# Patient Record
Sex: FEMALE | Race: WHITE | NOT HISPANIC OR LATINO | Employment: FULL TIME | URBAN - METROPOLITAN AREA
[De-identification: names, ages, dates, MRNs, and addresses within clinical notes are randomized per-mention and may not be internally consistent; named-entity substitution may affect disease eponyms.]

---

## 2017-01-02 DIAGNOSIS — I10 ESSENTIAL HYPERTENSION: ICD-10-CM

## 2017-01-03 RX ORDER — BENAZEPRIL HYDROCHLORIDE 5 MG/1
5 TABLET, FILM COATED ORAL DAILY
Qty: 30 TABLET | Refills: 0 | Status: SHIPPED | OUTPATIENT
Start: 2017-01-03 | End: 2017-01-28 | Stop reason: SDUPTHER

## 2017-01-28 DIAGNOSIS — I10 ESSENTIAL HYPERTENSION: ICD-10-CM

## 2017-01-30 RX ORDER — BENAZEPRIL HYDROCHLORIDE 5 MG/1
5 TABLET, FILM COATED ORAL DAILY
Qty: 15 TABLET | Refills: 0 | Status: SHIPPED | OUTPATIENT
Start: 2017-01-30 | End: 2017-02-03 | Stop reason: SDUPTHER

## 2017-02-03 ENCOUNTER — OFFICE VISIT (OUTPATIENT)
Dept: INTERNAL MEDICINE | Age: 57
End: 2017-02-03

## 2017-02-03 VITALS
TEMPERATURE: 96.7 F | OXYGEN SATURATION: 99 % | HEIGHT: 63 IN | BODY MASS INDEX: 24.17 KG/M2 | HEART RATE: 74 BPM | SYSTOLIC BLOOD PRESSURE: 130 MMHG | WEIGHT: 136.4 LBS | DIASTOLIC BLOOD PRESSURE: 74 MMHG

## 2017-02-03 DIAGNOSIS — I10 ESSENTIAL HYPERTENSION: ICD-10-CM

## 2017-02-03 DIAGNOSIS — Z00.00 ROUTINE HEALTH MAINTENANCE: ICD-10-CM

## 2017-02-03 DIAGNOSIS — J20.9 ACUTE BRONCHITIS, UNSPECIFIED ORGANISM: ICD-10-CM

## 2017-02-03 DIAGNOSIS — I10 ESSENTIAL HYPERTENSION: Primary | ICD-10-CM

## 2017-02-03 PROCEDURE — 99213 OFFICE O/P EST LOW 20 MIN: CPT | Performed by: INTERNAL MEDICINE

## 2017-02-03 RX ORDER — AZITHROMYCIN 250 MG/1
TABLET, FILM COATED ORAL
Qty: 90 TABLET | Refills: 3 | Status: SHIPPED | OUTPATIENT
Start: 2017-02-03 | End: 2017-07-28

## 2017-02-03 RX ORDER — BENAZEPRIL HYDROCHLORIDE 5 MG/1
5 TABLET, FILM COATED ORAL DAILY
Qty: 30 TABLET | Refills: 5 | Status: SHIPPED | OUTPATIENT
Start: 2017-02-03 | End: 2017-07-28 | Stop reason: SINTOL

## 2017-02-03 RX ORDER — MELOXICAM 7.5 MG/1
7.5 TABLET ORAL
COMMUNITY
Start: 2016-08-12 | End: 2017-02-03

## 2017-02-03 NOTE — PROGRESS NOTES
Subjective   Nancie Sanchez is a 56 y.o. female.     History of Present Illness since today for 6 month follow-up of hypertension.  She has been compliant with medication, dietary salt restriction, regular exercise as well as outpatient blood pressure monitoring.  Pressure has been running in the 1:30 range systolic.    The following portions of the patient's history were reviewed and updated as appropriate: allergies, current medications, past family history, past medical history, past social history, past surgical history and problem list.    Review of Systems   Respiratory: Negative for chest tightness and shortness of breath.    Cardiovascular: Negative for chest pain and palpitations.   Neurological: Negative for dizziness, syncope, speech difficulty, weakness, light-headedness and headaches.       Objective   Physical Exam   Constitutional: She is oriented to person, place, and time. She appears well-developed and well-nourished. No distress.   Cardiovascular: Normal rate, regular rhythm, normal heart sounds and intact distal pulses.  Exam reveals no gallop and no friction rub.    No murmur heard.  Pulmonary/Chest: Effort normal and breath sounds normal. She has no wheezes. She has no rales.   Musculoskeletal: She exhibits no edema.   Neurological: She is alert and oriented to person, place, and time.   Skin: Skin is warm and dry. No rash noted.   Psychiatric: She has a normal mood and affect. Her behavior is normal. Judgment and thought content normal.   Nursing note and vitals reviewed.      Assessment/Plan   Nancie was seen today for hypertension.    Diagnoses and all orders for this visit:    Essential hypertension    Routine health maintenance      #1 essential hypertension stable on current medical regimen.  Plan: Same meds, blood pressure check 5 months.  We will check lab at next visit.    #2 routine health maintenance, physical exam July.

## 2017-07-28 ENCOUNTER — OFFICE VISIT (OUTPATIENT)
Dept: INTERNAL MEDICINE | Age: 57
End: 2017-07-28

## 2017-07-28 VITALS
WEIGHT: 144.8 LBS | DIASTOLIC BLOOD PRESSURE: 100 MMHG | HEART RATE: 63 BPM | HEIGHT: 63 IN | BODY MASS INDEX: 25.66 KG/M2 | OXYGEN SATURATION: 99 % | SYSTOLIC BLOOD PRESSURE: 200 MMHG | TEMPERATURE: 99 F

## 2017-07-28 DIAGNOSIS — R07.89 CHEST WALL PAIN: ICD-10-CM

## 2017-07-28 DIAGNOSIS — T46.4X5A COUGH DUE TO ACE INHIBITOR: ICD-10-CM

## 2017-07-28 DIAGNOSIS — I10 ESSENTIAL HYPERTENSION: ICD-10-CM

## 2017-07-28 DIAGNOSIS — Z00.00 ROUTINE HEALTH MAINTENANCE: Primary | ICD-10-CM

## 2017-07-28 DIAGNOSIS — R05.8 COUGH DUE TO ACE INHIBITOR: ICD-10-CM

## 2017-07-28 PROCEDURE — 99396 PREV VISIT EST AGE 40-64: CPT | Performed by: INTERNAL MEDICINE

## 2017-07-28 RX ORDER — AMLODIPINE BESYLATE 5 MG/1
5 TABLET ORAL DAILY
Qty: 30 TABLET | Refills: 1 | Status: SHIPPED | OUTPATIENT
Start: 2017-07-28 | End: 2017-08-31 | Stop reason: SINTOL

## 2017-07-28 NOTE — PROGRESS NOTES
"Nancie Sanchez / 56 y.o. / female  07/28/2017  VITALS    BP (!) 200/100  Pulse 63  Temp 99 °F (37.2 °C)  Ht 63\" (160 cm)  Wt 144 lb 12.8 oz (65.7 kg)  SpO2 99%  BMI 25.65 kg/m2  BP Readings from Last 3 Encounters:   07/28/17 (!) 200/100   06/30/17 166/79   02/03/17 130/74     Wt Readings from Last 3 Encounters:   07/28/17 144 lb 12.8 oz (65.7 kg)   02/03/17 136 lb 6.4 oz (61.9 kg)   01/22/17 128 lb (58.1 kg)      Body mass index is 25.65 kg/(m^2).    CC:  Main reason(s) for today's visit: Annual Exam (CPE); Chest Pain (in am over L breast area, questions stres with new job, SOA upon wakening); and Neck Pain (L sided)      HPI:     Patient was seen in the urgent care center early July with left-sided chest pain.  Present for approximately one month.  At the onset, it was intermittent, became more persistent.  Pain was noted to be altered slightly with movement, breathing, and was tender to touch.  She notes no pain with exertion.  There is an extremely significant family history of coronary artery disease.  Her sister just underwent bypass surgery and she is approximately 18 months older than the patient.  Pain was without radiation, nor was it associated with shortness of breath nausea and sweating.  No treatment was provided immediate care center.  She has been treating this with ibuprofen 800 mg 2 tablets per day.  We did discuss the importance of taking one tablet a time with food.    Hypertension: She is compliant with medication, exercise, and dietary  salt restriction.  Despite this, she has gained approximately 16 pounds since earlier this year.  She is probably an early menopause, last menstrual cycle approxi-6 months ago.    Patient Care Team:  Alexx Abrams MD as PCP - General    ____________________________________________________________________    ASSESSMENT & PLAN:    Problem List Items Addressed This Visit        Unprioritized    Routine health maintenance - Primary    Essential " hypertension    Relevant Medications    amLODIPine (NORVASC) 5 MG tablet      Other Visit Diagnoses     Chest wall pain        Cough due to ACE inhibitor            No orders of the defined types were placed in this encounter.      Summary/Discussion:     ·   Number-one routine health maintenance, clinically stable, see comments above and below.    Chest wall pain pain is reproduced with digital pressure.  There are no symptoms of typical anginal pain.  Cardiogram is normal.  Patient has been taking ibuprofen but only one dose per day.  We did discuss the importance of using the medication on a 3 times a day basis with food for at least 7-10 days.  Also suggested local heat 3-4 times a day as her schedule permits for at least 20 minutes.  I have asked that she not do any upper body weight training.  She can do aerobics, and lower extremity work but not until she finishes the anti-inflammatory medication.    #3 hypertension stage II needs improvement, see #4.    #4 cough due to ACE inhibitor discontinue benazepril, substitute amlodipine at 5 mg per day, blood pressure check 4 weeks.    Return in about 4 weeks (around 8/25/2017) for Blood pressure check.    Future Appointments  Date Time Provider Department Center   8/31/2017 7:40 AM Alexx Abrams MD [facility] None       ______________________________________________________    REVIEW OF SYSTEMS    Review of Systems   Constitutional: Negative.    HENT: Negative.    Eyes: Positive for visual disturbance.        Patient was seen by ophthalmology, no refractive error noted.   Respiratory: Positive for cough.    Cardiovascular: Positive for chest pain.        See history of present illness   Gastrointestinal: Negative.    Endocrine: Negative.    Genitourinary: Negative.    Musculoskeletal: Negative.    Skin: Negative.    Allergic/Immunologic: Negative for immunocompromised state.   Neurological: Negative.    Hematological: Negative.    Psychiatric/Behavioral:  Negative.         Patient undergoing some degree of stress because of new job, with sleep learning curve at this point.  She has been at a new job for approximately 2 months.  There is also stress involved with the fact that her sister recently had a bypass surgery.         PHYSICAL EXAMINATION    Physical Exam   Constitutional: She is oriented to person, place, and time. She appears well-developed and well-nourished. No distress.   HENT:   Head: Normocephalic and atraumatic.   Right Ear: Tympanic membrane, external ear and ear canal normal.   Left Ear: Tympanic membrane, external ear and ear canal normal.   Mouth/Throat: Uvula is midline, oropharynx is clear and moist and mucous membranes are normal.   Eyes: Conjunctivae and EOM are normal. Pupils are equal, round, and reactive to light.   Neck: Normal range of motion. Neck supple. No JVD present. Carotid bruit is not present. No thyromegaly present.   Cardiovascular: Normal rate, regular rhythm, normal heart sounds and intact distal pulses.  Exam reveals no gallop and no friction rub.    No murmur heard.  Pulmonary/Chest: Effort normal and breath sounds normal. She has no wheezes. She has no rales.   Abdominal: Soft. Bowel sounds are normal. There is no hepatosplenomegaly. There is no tenderness.   Genitourinary:   Genitourinary Comments: Defer to gynecology   Musculoskeletal: Normal range of motion. She exhibits no edema or deformity.   Chest pain reproduced in the exam room gentle manual pressure on the T1 through T4 costochondral junction left side.   Lymphadenopathy:     She has no cervical adenopathy.   Neurological: She is alert and oriented to person, place, and time. She has normal strength and normal reflexes. No cranial nerve deficit or sensory deficit. Coordination normal.   Skin: Skin is warm and dry. No rash noted.   Psychiatric: She has a normal mood and affect. Her speech is normal and behavior is normal. Judgment and thought content normal.  Cognition and memory are normal.   Nursing note and vitals reviewed.      REVIEWED DATA:    Labs:   Lab Results   Component Value Date     07/02/2016    K 3.7 07/02/2016    AST 15 06/30/2016    ALT 12 06/30/2016    BUN 15 07/02/2016    CREATININE 0.46 (L) 07/02/2016    CREATININE 0.45 (L) 07/01/2016    CREATININE 0.68 06/30/2016    EGFRIFNONA 142 07/02/2016    EGFRIFAFRI >60 04/15/2015          Lab Results   Component Value Date    HGBA1C 5.10 07/01/2016    GLU 94 04/15/2015       Lab Results   Component Value Date     (H) 08/05/2016     (H) 04/15/2015    HDL 69 (H) 08/05/2016    TRIG 102 08/05/2016       No results found for: TSH, FREET4       Lab Results   Component Value Date    WBC 6.67 07/02/2016    HGB 13.0 07/02/2016    HGB 12.8 07/01/2016    HGB 13.0 06/30/2016     07/02/2016        Imaging:        Medical Tests:        Summary of old records / correspondence / consultant report:        Request outside records:        Allergies   Allergen Reactions   • Indomethacin Other (See Comments)     Sensitive to the drug--stomach upset  Sensitive to the drug   • Nsaids Nausea Only   • Pepcid [Famotidine] Dizziness       Current Outpatient Prescriptions on File Prior to Visit   Medication Sig Dispense Refill   • ibuprofen (ADVIL,MOTRIN) 800 MG tablet Take 800 mg by mouth Every 6 (Six) Hours As Needed for Mild Pain (1-3).     • [DISCONTINUED] aspirin 81 MG tablet Take 1 tablet by mouth daily. 30 tablet 0   • [DISCONTINUED] azithromycin (ZITHROMAX Z-DEISY) 250 MG tablet Take 2 tablets the first day, then 1 tablet daily for 4 days. 90 tablet 3   • [DISCONTINUED] benazepril (LOTENSIN) 5 MG tablet Take 1 tablet by mouth Daily. 30 tablet 5     No current facility-administered medications on file prior to visit.        PFSH:     The following portions of the patient's history were reviewed and updated as appropriate: Allergies / Current Medications / Past Medical History / Surgical History / Social  History / Family History    Patient Active Problem List   Diagnosis   • Dermatitis, eczematoid   • Blood pressure elevated without history of HTN   • HLD (hyperlipidemia)   • Headache, migraine   • Avitaminosis D   • Transient cerebral ischemia   • Routine health maintenance   • Essential hypertension   • Lateral epicondylitis   • Cervical intraepithelial neoplasia grade 1   • Primary osteoarthritis of both first carpometacarpal joints       Past Medical History:   Diagnosis Date   • Hypertension    • Migraine    • Migraines    • Vitamin D deficiency        Past Surgical History:   Procedure Laterality Date   • BREAST SURGERY     • DILATATION AND CURETTAGE     • ELBOW ARTHROPLASTY Right        Social History     Social History   • Marital status: Single     Spouse name: N/A   • Number of children: N/A   • Years of education: N/A     Occupational History   • sales  [employer]      Social History Main Topics   • Smoking status: Never Smoker   • Smokeless tobacco: Never Used   • Alcohol use Yes      Comment: occasional   • Drug use: No   • Sexual activity: Defer     Other Topics Concern   • None     Social History Narrative   • None       Family History   Problem Relation Age of Onset   • Stroke Mother    • Coronary artery disease Father      MI    • Diabetes Sister    • Coronary artery disease Sister      CABG   • Coronary artery disease Brother      PTCA MI   • Stroke Brother          **Maury Disclaimer:   Much of this encounter note is an electronic transcription/translation of spoken language to printed text. The electronic translation of spoken language may permit erroneous, or at times, nonsensical words or phrases to be inadvertently transcribed. Although I have reviewed the note for such errors, some may still exist.

## 2017-08-31 ENCOUNTER — OFFICE VISIT (OUTPATIENT)
Dept: INTERNAL MEDICINE | Age: 57
End: 2017-08-31

## 2017-08-31 VITALS
OXYGEN SATURATION: 98 % | DIASTOLIC BLOOD PRESSURE: 88 MMHG | HEIGHT: 63 IN | WEIGHT: 141.8 LBS | TEMPERATURE: 97.9 F | HEART RATE: 70 BPM | BODY MASS INDEX: 25.12 KG/M2 | SYSTOLIC BLOOD PRESSURE: 134 MMHG

## 2017-08-31 DIAGNOSIS — I10 ESSENTIAL HYPERTENSION: Primary | ICD-10-CM

## 2017-08-31 DIAGNOSIS — R07.89 CHEST WALL PAIN: ICD-10-CM

## 2017-08-31 DIAGNOSIS — Z12.12 SCREENING FOR COLORECTAL CANCER: ICD-10-CM

## 2017-08-31 DIAGNOSIS — R68.89 COLD INTOLERANCE: ICD-10-CM

## 2017-08-31 DIAGNOSIS — Z12.11 SCREENING FOR COLORECTAL CANCER: ICD-10-CM

## 2017-08-31 LAB
ALBUMIN SERPL-MCNC: 4.8 G/DL (ref 3.5–5.2)
ALBUMIN/GLOB SERPL: 2 G/DL
ALP SERPL-CCNC: 67 U/L (ref 39–117)
ALT SERPL-CCNC: 19 U/L (ref 1–33)
AST SERPL-CCNC: 13 U/L (ref 1–32)
BILIRUB SERPL-MCNC: 0.3 MG/DL (ref 0.1–1.2)
BUN SERPL-MCNC: 22 MG/DL (ref 6–20)
BUN/CREAT SERPL: 34.4 (ref 7–25)
CALCIUM SERPL-MCNC: 9.5 MG/DL (ref 8.6–10.5)
CHLORIDE SERPL-SCNC: 100 MMOL/L (ref 98–107)
CHOLEST SERPL-MCNC: 255 MG/DL (ref 0–200)
CO2 SERPL-SCNC: 30.4 MMOL/L (ref 22–29)
CREAT SERPL-MCNC: 0.64 MG/DL (ref 0.57–1)
GLOBULIN SER CALC-MCNC: 2.4 GM/DL
GLUCOSE SERPL-MCNC: 104 MG/DL (ref 65–99)
HDLC SERPL-MCNC: 73 MG/DL (ref 40–60)
LDLC SERPL CALC-MCNC: 160 MG/DL (ref 0–100)
POTASSIUM SERPL-SCNC: 4.1 MMOL/L (ref 3.5–5.2)
PROT SERPL-MCNC: 7.2 G/DL (ref 6–8.5)
SODIUM SERPL-SCNC: 143 MMOL/L (ref 136–145)
TRIGL SERPL-MCNC: 112 MG/DL (ref 0–150)
TSH SERPL DL<=0.005 MIU/L-ACNC: 0.85 MIU/ML (ref 0.27–4.2)
VLDLC SERPL CALC-MCNC: 22.4 MG/DL (ref 5–40)

## 2017-08-31 PROCEDURE — 99214 OFFICE O/P EST MOD 30 MIN: CPT | Performed by: INTERNAL MEDICINE

## 2017-08-31 RX ORDER — MELOXICAM 7.5 MG/1
7.5 TABLET ORAL DAILY
Qty: 30 TABLET | Refills: 1 | Status: SHIPPED | OUTPATIENT
Start: 2017-08-31 | End: 2017-11-07 | Stop reason: ALTCHOICE

## 2017-08-31 RX ORDER — CANDESARTAN 8 MG/1
8 TABLET ORAL DAILY
Qty: 30 TABLET | Refills: 1 | Status: SHIPPED | OUTPATIENT
Start: 2017-08-31 | End: 2017-10-30

## 2017-09-28 DIAGNOSIS — I10 ESSENTIAL HYPERTENSION: ICD-10-CM

## 2017-09-28 RX ORDER — AMLODIPINE BESYLATE 5 MG/1
TABLET ORAL
Qty: 30 TABLET | Refills: 0 | OUTPATIENT
Start: 2017-09-28

## 2017-10-26 ENCOUNTER — TELEPHONE (OUTPATIENT)
Dept: INTERNAL MEDICINE | Age: 57
End: 2017-10-26

## 2017-10-26 NOTE — TELEPHONE ENCOUNTER
Pt called following up when she had seen Dr Abrams regarding pain in her chest. Pt said he prescribed an antiinflammatory medication that has helped, but not getting anywhere, because the discomfort occurs every morning.  Pt states it wakes her up and its on the top left breast in a specific spot.   Pt wanted an appt to go over the issue but nothing available for a few weeks. Pt said she would like if Dr Abrams would investigate further.  Pt's # [phone]  Thanks SP

## 2017-10-30 ENCOUNTER — HOSPITAL ENCOUNTER (OUTPATIENT)
Dept: GENERAL RADIOLOGY | Facility: HOSPITAL | Age: 57
Discharge: HOME | End: 2017-10-30
Admitting: NURSE PRACTITIONER

## 2017-10-30 ENCOUNTER — OFFICE VISIT (OUTPATIENT)
Dept: INTERNAL MEDICINE | Age: 57
End: 2017-10-30

## 2017-10-30 VITALS
SYSTOLIC BLOOD PRESSURE: 124 MMHG | DIASTOLIC BLOOD PRESSURE: 84 MMHG | WEIGHT: 135.8 LBS | OXYGEN SATURATION: 99 % | HEART RATE: 61 BPM | TEMPERATURE: 98.1 F | BODY MASS INDEX: 24.06 KG/M2 | HEIGHT: 63 IN

## 2017-10-30 DIAGNOSIS — I10 ESSENTIAL HYPERTENSION: ICD-10-CM

## 2017-10-30 DIAGNOSIS — R07.89 LEFT-SIDED CHEST WALL PAIN: Primary | ICD-10-CM

## 2017-10-30 DIAGNOSIS — Z82.49 FAMILY HISTORY OF HEART DISEASE: ICD-10-CM

## 2017-10-30 PROCEDURE — 71020 HC CHEST PA AND LATERAL: CPT

## 2017-10-30 PROCEDURE — 99214 OFFICE O/P EST MOD 30 MIN: CPT | Performed by: NURSE PRACTITIONER

## 2017-10-30 RX ORDER — CANDESARTAN 8 MG/1
TABLET ORAL
Qty: 30 TABLET | Refills: 0 | OUTPATIENT
Start: 2017-10-30

## 2017-10-30 RX ORDER — LOSARTAN POTASSIUM 50 MG/1
50 TABLET ORAL DAILY
Qty: 30 TABLET | Refills: 1 | Status: SHIPPED | OUTPATIENT
Start: 2017-10-30 | End: 2017-11-07 | Stop reason: ALTCHOICE

## 2017-10-30 RX ORDER — METFORMIN HYDROCHLORIDE 500 MG/1
TABLET, EXTENDED RELEASE ORAL
Refills: 0 | COMMUNITY
Start: 2017-09-29 | End: 2017-11-07 | Stop reason: ALTCHOICE

## 2017-10-30 RX ORDER — BUPROPION HYDROCHLORIDE 150 MG/1
TABLET ORAL
Refills: 0 | COMMUNITY
Start: 2017-09-09 | End: 2017-12-18

## 2017-10-30 RX ORDER — TOPIRAMATE 25 MG/1
TABLET ORAL
Refills: 0 | COMMUNITY
Start: 2017-09-09 | End: 2017-11-07 | Stop reason: ALTCHOICE

## 2017-10-30 NOTE — PATIENT INSTRUCTIONS
CHECK BLOOD PRESSURES TWICE DAILY AND RECORD. NOTIFY ME OF READINGS IN 1 WEEK.     Chest Wall Pain  Chest wall pain is pain in or around the bones and muscles of your chest. Sometimes, an injury causes this pain. Sometimes, the cause may not be known. This pain may take several weeks or longer to get better.  HOME CARE INSTRUCTIONS   Pay attention to any changes in your symptoms. Take these actions to help with your pain:   · Rest as told by your health care provider.    · Avoid activities that cause pain. These include any activities that use your chest muscles or your abdominal and side muscles to lift heavy items.     · If directed, apply ice to the painful area:    Put ice in a plastic bag.    Place a towel between your skin and the bag.    Leave the ice on for 20 minutes, 2-3 times per day.  · Take over-the-counter and prescription medicines only as told by your health care provider.  · Do not use tobacco products, including cigarettes, chewing tobacco, and e-cigarettes. If you need help quitting, ask your health care provider.  · Keep all follow-up visits as told by your health care provider. This is important.  SEEK MEDICAL CARE IF:  · You have a fever.  · Your chest pain becomes worse.  · You have new symptoms.  SEEK IMMEDIATE MEDICAL CARE IF:  · You have nausea or vomiting.  · You feel sweaty or light-headed.  · You have a cough with phlegm (sputum) or you cough up blood.  · You develop shortness of breath.     This information is not intended to replace advice given to you by your health care provider. Make sure you discuss any questions you have with your health care provider.     Document Released: 12/18/2006 Document Revised: 09/07/2016 Document Reviewed: 03/14/2016  [employer] Patient Education ©2017 [employer]

## 2017-10-30 NOTE — PROGRESS NOTES
Subjective   Nancie Sanchez is a 56 y.o. female.     Chest Pain    This is a new problem. Episode onset: since June 2017. Onset quality: daily (in AM every day), lasts for about 45 minutes. The problem occurs constantly. The problem has been unchanged. Pain location: left chest. The quality of the pain is described as dull. The pain does not radiate. Pertinent negatives include no cough, fever, palpitations or shortness of breath. The pain is aggravated by nothing. She has tried NSAIDs (heat) for the symptoms. The treatment provided mild relief. Risk factors include post-menopausal (strong family history of CAD/CVA).   Her past medical history is significant for anxiety/panic attacks and hypertension.   Pertinent negatives for past medical history include no MI and no PE.   Her family medical history is significant for CAD, heart disease, hypertension, stroke and TIA. Prior diagnostic workup includes echocardiogram.   She has been seen for this issue by Dr. Abrams in the past and was told it was some type of chest wall inflammation. She is extremely concerned about the duration of the pain (x 4 months) and her strong family history of heart disease. She reports she stopped working out for awhile after the pain first started, but has since resumed light weight lifting without any noted change in symptoms.       The following portions of the patient's history were reviewed and updated as appropriate: allergies, current medications, past family history, past medical history, past social history, past surgical history and problem list.    Review of Systems   Constitutional: Negative for chills and fever.   Respiratory: Negative for cough, chest tightness and shortness of breath.    Cardiovascular: Positive for chest pain. Negative for palpitations and leg swelling.       Objective   Physical Exam   Constitutional: She is oriented to person, place, and time. Vital signs are normal. She appears well-developed and  well-nourished. She is cooperative. She does not appear ill. No distress.   Cardiovascular: Normal rate, regular rhythm, S1 normal, S2 normal and normal heart sounds.    No murmur heard.  Pulmonary/Chest: Effort normal and breath sounds normal. She has no decreased breath sounds. She has no wheezes. She has no rhonchi. She has no rales. She exhibits tenderness (Patient localizes tenderness/pain to left upper pectoral area. Able to reproduce discomfort with palpation. No abnormalities palpated. ).       Neurological: She is alert and oriented to person, place, and time.   Skin: Skin is warm, dry and intact.   Psychiatric: She has a normal mood and affect. Her speech is normal and behavior is normal. Judgment and thought content normal. Cognition and memory are normal.   Nursing note and vitals reviewed.      Assessment/Plan   Problems Addressed this Visit        Cardiovascular and Mediastinum    Essential hypertension    Relevant Medications    losartan (COZAAR) 50 MG tablet      Other Visit Diagnoses     Left-sided chest wall pain    -  Primary    Relevant Orders    Ambulatory Referral to Cardiology    XR Chest PA & Lateral (Completed)    Family history of heart disease        Relevant Orders    Ambulatory Referral to Cardiology        1. Left-sided chest wall pain  Patient with persistent, daily, left-sided chest wall pain times approximately 4 months, with no improvement in symptoms.  Assessment findings leave me to believe that this is more likely a musculoskeletal chest wall pain versus cardiac, but patient is anxious related to her significant family history of heart disease and CAD. Will send patient per her request for cardiac evaluation, obtain CXR today to look for any chest wall pathology. May continue use of NSAID at this time as it does provide some relief for her discomfort. Follow up in approximately 4 weeks.     - Ambulatory Referral to Cardiology  - XR Chest PA & Lateral    2. Family history of heart  disease    - Ambulatory Referral to Cardiology    3. Essential hypertension  Patient requests to have current antihypertensive medication (candesartan) changed related to cost.  Prescription sent to pharmacy for equivalent dosage of losartan 50 mg daily.  Instructed patient to continue to monitor blood pressures daily and contact me in office in approximately one week with readings.     - losartan (COZAAR) 50 MG tablet; Take 1 tablet by mouth Daily.  Dispense: 30 tablet; Refill: 1

## 2017-10-31 ENCOUNTER — TELEPHONE (OUTPATIENT)
Dept: INTERNAL MEDICINE | Age: 57
End: 2017-10-31

## 2017-10-31 NOTE — TELEPHONE ENCOUNTER
Spoke w/ pt regarding X-ray results.   Pt was informed of normal X-ray results. Pt was advised to wait for cardio consult and f/u w/ Dr. Abrams.  Pt demonstrated understanding.    KD

## 2017-10-31 NOTE — TELEPHONE ENCOUNTER
----- Message from THANH Olmos sent at 10/31/2017  7:22 AM EDT -----  Please call patient with results and send result letter to home address.    Nancie:     Your Chest XR results were normal. We will await consult from cardiology and follow up with Dr. Abrams as scheduled.     Katia LAW

## 2017-11-07 ENCOUNTER — OFFICE VISIT (OUTPATIENT)
Dept: CARDIOLOGY | Facility: CLINIC | Age: 57
End: 2017-11-07

## 2017-11-07 VITALS
DIASTOLIC BLOOD PRESSURE: 88 MMHG | WEIGHT: 138 LBS | SYSTOLIC BLOOD PRESSURE: 176 MMHG | BODY MASS INDEX: 24.45 KG/M2 | HEART RATE: 61 BPM

## 2017-11-07 DIAGNOSIS — F43.9 STRESS: ICD-10-CM

## 2017-11-07 DIAGNOSIS — I10 ESSENTIAL HYPERTENSION: ICD-10-CM

## 2017-11-07 DIAGNOSIS — E78.5 HYPERLIPIDEMIA, UNSPECIFIED HYPERLIPIDEMIA TYPE: ICD-10-CM

## 2017-11-07 DIAGNOSIS — R07.2 PRECORDIAL PAIN: Primary | ICD-10-CM

## 2017-11-07 PROCEDURE — 93000 ELECTROCARDIOGRAM COMPLETE: CPT | Performed by: INTERNAL MEDICINE

## 2017-11-07 PROCEDURE — 99204 OFFICE O/P NEW MOD 45 MIN: CPT | Performed by: INTERNAL MEDICINE

## 2017-11-07 RX ORDER — BISOPROLOL FUMARATE AND HYDROCHLOROTHIAZIDE 5; 6.25 MG/1; MG/1
1 TABLET ORAL DAILY
Qty: 30 TABLET | Refills: 6 | Status: SHIPPED | OUTPATIENT
Start: 2017-11-07 | End: 2018-02-08 | Stop reason: SDUPTHER

## 2017-11-07 RX ORDER — OMEGA-3 FATTY ACIDS/FISH OIL 300-1000MG
1 CAPSULE ORAL AS NEEDED
COMMUNITY
End: 2019-11-26

## 2017-11-07 NOTE — PROGRESS NOTES
Subjective:        CC  Pain on lt side above breast, increase with breath, last ,      Nancie Sanchez is a 56 y.o. female who Is here for the cardiac evaluation as a patient has been complaining of the pain on the left side of both of breast, increase with a deep breathing since last  mild-to-moderate in intensity, intermittent lasting for a few minutes associated with shortness of breath and anxiety Cardiac risk factors: family history of premature cardiovascular disease and hypertension.    Past Medical History:   Diagnosis Date   • Hypertension    • Migraine    • Migraines    • Vitamin D deficiency     reports that she has never smoked. She has never used smokeless tobacco. She reports that she drinks alcohol. She reports that she does not use illicit drugs.  Family History   Problem Relation Age of Onset   • Stroke Mother    • Coronary artery disease Father      MI    • Heart attack Father    • Diabetes Sister    • Coronary artery disease Sister      CABG   • Coronary artery disease Brother      PTCA MI   • Stroke Brother    • Heart attack Brother    • Heart disease Brother         Review of Systems  Constitutional: No wt loss, fever   Gastrointestinal: No nausea , abdominal pain  Behavioral/Psych: No insomnia or anxiety  Cardiovascular ----positive for Chest pain. All other systems reviewed and are negative    Objective:       Physical Exam             Physical Exam  /88  Pulse 61  Wt 138 lb (62.6 kg)  BMI 24.45 kg/m2    General appearance: NAD, conversant   Eyes: anicteric sclerae, moist conjunctivae; no lid-lag; PERRLA   HENT: Atraumatic; oropharynx clear with moist mucous membranes and no mucosal ulcerations;  normal hard and soft palate   Neck: Trachea midline; FROM, supple, no thyromegaly or lymphadenopathy   Lungs: CTA, with normal respiratory effort and no intercostal retractions   CV: S1-S2 regular, no murmurs, no rub, no gallop   Abdomen: Soft, non-tender; no masses or  HSM   Extremities: No peripheral edema or extremity lymphadenopathy  Skin: Normal temperature, turgor and texture; no rash, ulcers or subcutaneous nodules   Psych: Appropriate affect, alert and oriented to person, place and time           Cardiographics  @  ECG 12 Lead  Date/Time: 11/7/2017 11:15 AM  Performed by: TREVOR LAMA  Authorized by: TREVOR LAMA   Comparison: compared with previous ECG   Similar to previous ECG  Rhythm: sinus rhythm  Other findings: LVH with strain  Clinical impression: abnormal ECG and non-specific ECG            Echocardiogram:     Imaging  Chest x-ray: not done     Lab Review   not applicable       Current Outpatient Prescriptions:   •  buPROPion XL (WELLBUTRIN XL) 150 MG 24 hr tablet, TK 1 T PO QD, Disp: , Rfl: 0  •  Ibuprofen (ADVIL) 200 MG capsule, Take  by mouth., Disp: , Rfl:   •  losartan (COZAAR) 50 MG tablet, Take 1 tablet by mouth Daily., Disp: 30 tablet, Rfl: 1        Assessment:      No diagnosis found.    Patient Active Problem List   Diagnosis   • Dermatitis, eczematoid   • Blood pressure elevated without history of HTN   • HLD (hyperlipidemia)   • Headache, migraine   • Avitaminosis D   • Transient cerebral ischemia   • Routine health maintenance   • Essential hypertension   • Lateral epicondylitis   • Cervical intraepithelial neoplasia grade 1   • Primary osteoarthritis of both first carpometacarpal joints     Total Cholesterol 0 - 200 mg/dL 255 (H)   Triglycerides 0 - 150 mg/dL 112   HDL Cholesterol 40 - 60 mg/dL 73 (H)   VLDL Cholesterol 5 - 40 mg/dL 22.4   LDL Cholesterol  0 - 100 mg/dL 160 (H)   Resulting Agency  [facility]   Narrative            Plan:          1. Precordial pain  Considering the patient's symptoms as well as clinical situation and  EKG findings, along with cardiac risk factors, ischemic workup is necessary to rule out ischemic cardiomyopathy, stress induced arrhythmias, and functional capacity for diagnosis as well as prognostic  consideration    Considering patient's medical condition as well as the risk factors, patient will require echocardiogram for further evaluation for the LV function, four-chamber evaluation, including the pressures, valvular function and  pericardial disease and pericardial effusion    - Stress Test With Myocardial Perfusion One Day  - Adult Transthoracic Echo Complete W/ Cont if Necessary Per Protocol    2. Stress  Patient has been under significant stress counseling has been done  - Stress Test With Myocardial Perfusion One Day  - Adult Transthoracic Echo Complete W/ Cont if Necessary Per Protocol    3. Essential hypertension  Blood pressure remains high blood pressure medications has been changed  - Stress Test With Myocardial Perfusion One Day  - Adult Transthoracic Echo Complete W/ Cont if Necessary Per Protocol    4. Hyperlipidemia, unspecified hyperlipidemia type  Counseling has been done  - Stress Test With Myocardial Perfusion One Day  - Adult Transthoracic Echo Complete W/ Cont if Necessary Per Protocol    Pros and cons of ASA in primary and secondary prevention of CAD has been discussed.  Risks of bleeding and other possible side effects have been discussed, Diff advantages and disadvantages of 325 vs 81  Mg of ASA were discussed, at this stage it has been recommended to start ASA 81 mg /day       Stress cardiolyte    Echo      Dc cozaar    ziac    Pros and cons of this new medication has been expended the patient    Possible side effects has been explained    Associated need of the blood  Work has been explained    Need for the compliance of the medication has been explained        Counseling was given to Nancie Sanchez for the following topics:  risk factor reductions, prognosis and risks and benefits of treatment options .       SMOKING COUNSELING:    Counseling given: Not Answered      .  EMR Dragon/Transcription disclaimer:   Much of this encounter note is an electronic  transcription/translation of spoken language to printed text. The electronic translation of spoken language may permit erroneous, or at times, nonsensical words or phrases to be inadvertently transcribed; Although I have reviewed the note for such errors, some may still exist.

## 2017-12-18 ENCOUNTER — OFFICE VISIT (OUTPATIENT)
Dept: CARDIOLOGY | Facility: CLINIC | Age: 57
End: 2017-12-18

## 2017-12-18 VITALS
BODY MASS INDEX: 24.63 KG/M2 | HEART RATE: 50 BPM | WEIGHT: 139 LBS | DIASTOLIC BLOOD PRESSURE: 63 MMHG | SYSTOLIC BLOOD PRESSURE: 144 MMHG | HEIGHT: 63 IN

## 2017-12-18 DIAGNOSIS — I77.9 LEFT-SIDED CAROTID ARTERY DISEASE (HCC): Primary | ICD-10-CM

## 2017-12-18 DIAGNOSIS — R07.2 PRECORDIAL PAIN: ICD-10-CM

## 2017-12-18 DIAGNOSIS — I10 ESSENTIAL HYPERTENSION: ICD-10-CM

## 2017-12-18 PROCEDURE — 99213 OFFICE O/P EST LOW 20 MIN: CPT | Performed by: INTERNAL MEDICINE

## 2017-12-18 RX ORDER — CHLORAL HYDRATE 500 MG
CAPSULE ORAL
COMMUNITY
End: 2019-01-15

## 2017-12-18 RX ORDER — MULTIPLE VITAMINS W/ MINERALS TAB 9MG-400MCG
1 TAB ORAL DAILY
COMMUNITY
End: 2019-01-15

## 2018-02-08 RX ORDER — BISOPROLOL FUMARATE AND HYDROCHLOROTHIAZIDE 5; 6.25 MG/1; MG/1
1 TABLET ORAL DAILY
Qty: 60 TABLET | Refills: 6 | Status: SHIPPED | OUTPATIENT
Start: 2018-02-08 | End: 2019-01-15

## 2018-05-17 DIAGNOSIS — I10 ESSENTIAL HYPERTENSION: ICD-10-CM

## 2018-05-17 RX ORDER — BENAZEPRIL HYDROCHLORIDE 5 MG/1
5 TABLET, FILM COATED ORAL DAILY
Qty: 30 TABLET | Refills: 0 | Status: SHIPPED | OUTPATIENT
Start: 2018-05-17 | End: 2019-01-15

## 2018-12-31 RX ORDER — BISOPROLOL FUMARATE AND HYDROCHLOROTHIAZIDE 5; 6.25 MG/1; MG/1
1 TABLET ORAL DAILY
Qty: 30 TABLET | Refills: 0 | Status: SHIPPED | OUTPATIENT
Start: 2018-12-31 | End: 2018-12-31 | Stop reason: SDUPTHER

## 2019-01-02 RX ORDER — BISOPROLOL FUMARATE AND HYDROCHLOROTHIAZIDE 5; 6.25 MG/1; MG/1
1 TABLET ORAL DAILY
Qty: 30 TABLET | Refills: 0 | Status: SHIPPED | OUTPATIENT
Start: 2019-01-02 | End: 2019-01-15

## 2019-01-15 ENCOUNTER — OFFICE VISIT (OUTPATIENT)
Dept: INTERNAL MEDICINE | Age: 59
End: 2019-01-15

## 2019-01-15 VITALS
BODY MASS INDEX: 26.12 KG/M2 | SYSTOLIC BLOOD PRESSURE: 170 MMHG | WEIGHT: 147.4 LBS | OXYGEN SATURATION: 97 % | DIASTOLIC BLOOD PRESSURE: 100 MMHG | HEART RATE: 57 BPM | HEIGHT: 63 IN | TEMPERATURE: 97.7 F

## 2019-01-15 DIAGNOSIS — Z00.00 ROUTINE HEALTH MAINTENANCE: Primary | ICD-10-CM

## 2019-01-15 DIAGNOSIS — I10 ESSENTIAL HYPERTENSION: ICD-10-CM

## 2019-01-15 DIAGNOSIS — R51.9 NONINTRACTABLE EPISODIC HEADACHE, UNSPECIFIED HEADACHE TYPE: ICD-10-CM

## 2019-01-15 DIAGNOSIS — I65.22 STENOSIS OF LEFT CAROTID ARTERY: ICD-10-CM

## 2019-01-15 LAB
ALBUMIN SERPL-MCNC: 4.6 G/DL (ref 3.5–5.2)
ALBUMIN/GLOB SERPL: 1.9 G/DL
ALP SERPL-CCNC: 67 U/L (ref 39–117)
ALT SERPL-CCNC: 22 U/L (ref 1–33)
APPEARANCE UR: CLEAR
AST SERPL-CCNC: 18 U/L (ref 1–32)
BACTERIA #/AREA URNS HPF: ABNORMAL /HPF
BASOPHILS # BLD AUTO: 0.01 10*3/MM3 (ref 0–0.2)
BASOPHILS NFR BLD AUTO: 0.2 % (ref 0–1.5)
BILIRUB SERPL-MCNC: 0.5 MG/DL (ref 0.1–1.2)
BILIRUB UR QL STRIP: NEGATIVE
BUN SERPL-MCNC: 16 MG/DL (ref 6–20)
BUN/CREAT SERPL: 25 (ref 7–25)
CALCIUM SERPL-MCNC: 9.8 MG/DL (ref 8.6–10.5)
CASTS URNS MICRO: ABNORMAL
CHLORIDE SERPL-SCNC: 100 MMOL/L (ref 98–107)
CHOLEST SERPL-MCNC: 252 MG/DL (ref 0–200)
CO2 SERPL-SCNC: 29.5 MMOL/L (ref 22–29)
COLOR UR: YELLOW
CREAT SERPL-MCNC: 0.64 MG/DL (ref 0.57–1)
EOSINOPHIL # BLD AUTO: 0.29 10*3/MM3 (ref 0–0.7)
EOSINOPHIL NFR BLD AUTO: 5 % (ref 0.3–6.2)
EPI CELLS #/AREA URNS HPF: ABNORMAL /HPF
ERYTHROCYTE [DISTWIDTH] IN BLOOD BY AUTOMATED COUNT: 13.3 % (ref 11.7–13)
GLOBULIN SER CALC-MCNC: 2.4 GM/DL
GLUCOSE SERPL-MCNC: 111 MG/DL (ref 65–99)
GLUCOSE UR QL: NEGATIVE
HCT VFR BLD AUTO: 43.2 % (ref 35.6–45.5)
HDLC SERPL-MCNC: 58 MG/DL (ref 40–60)
HGB BLD-MCNC: 14.3 G/DL (ref 11.9–15.5)
HGB UR QL STRIP: NEGATIVE
IMM GRANULOCYTES # BLD AUTO: 0.01 10*3/MM3 (ref 0–0.03)
IMM GRANULOCYTES NFR BLD AUTO: 0.2 % (ref 0–0.5)
KETONES UR QL STRIP: NEGATIVE
LDLC SERPL CALC-MCNC: 159 MG/DL (ref 0–100)
LEUKOCYTE ESTERASE UR QL STRIP: ABNORMAL
LYMPHOCYTES # BLD AUTO: 1.45 10*3/MM3 (ref 0.9–4.8)
LYMPHOCYTES NFR BLD AUTO: 25.1 % (ref 19.6–45.3)
MCH RBC QN AUTO: 29.3 PG (ref 26.9–32)
MCHC RBC AUTO-ENTMCNC: 33.1 G/DL (ref 32.4–36.3)
MCV RBC AUTO: 88.5 FL (ref 80.5–98.2)
MONOCYTES # BLD AUTO: 0.41 10*3/MM3 (ref 0.2–1.2)
MONOCYTES NFR BLD AUTO: 7.1 % (ref 5–12)
NEUTROPHILS # BLD AUTO: 3.61 10*3/MM3 (ref 1.9–8.1)
NEUTROPHILS NFR BLD AUTO: 62.6 % (ref 42.7–76)
NITRITE UR QL STRIP: NEGATIVE
PH UR STRIP: 6 [PH] (ref 5–8)
PLATELET # BLD AUTO: 281 10*3/MM3 (ref 140–500)
POTASSIUM SERPL-SCNC: 4.3 MMOL/L (ref 3.5–5.2)
PROT SERPL-MCNC: 7 G/DL (ref 6–8.5)
PROT UR QL STRIP: NEGATIVE
RBC # BLD AUTO: 4.88 10*6/MM3 (ref 3.9–5.2)
RBC #/AREA URNS HPF: ABNORMAL /HPF
SODIUM SERPL-SCNC: 141 MMOL/L (ref 136–145)
SP GR UR: 1.03 (ref 1–1.03)
TRIGL SERPL-MCNC: 173 MG/DL (ref 0–150)
UROBILINOGEN UR STRIP-MCNC: ABNORMAL MG/DL
VLDLC SERPL CALC-MCNC: 34.6 MG/DL (ref 5–40)
WBC # BLD AUTO: 5.77 10*3/MM3 (ref 4.5–10.7)
WBC #/AREA URNS HPF: ABNORMAL /HPF

## 2019-01-15 PROCEDURE — 99396 PREV VISIT EST AGE 40-64: CPT | Performed by: INTERNAL MEDICINE

## 2019-01-15 RX ORDER — BISOPROLOL FUMARATE AND HYDROCHLOROTHIAZIDE 10; 6.25 MG/1; MG/1
1 TABLET ORAL DAILY
Qty: 30 TABLET | Refills: 1 | Status: SHIPPED | OUTPATIENT
Start: 2019-01-15 | End: 2019-01-15 | Stop reason: SDUPTHER

## 2019-01-15 RX ORDER — BISOPROLOL FUMARATE AND HYDROCHLOROTHIAZIDE 10; 6.25 MG/1; MG/1
1 TABLET ORAL DAILY
Qty: 90 TABLET | Refills: 0 | Status: SHIPPED | OUTPATIENT
Start: 2019-01-15 | End: 2019-04-08 | Stop reason: SDUPTHER

## 2019-01-15 NOTE — PROGRESS NOTES
[facility] [place] INTERNAL MEDICINE  MD Sharmin DONradha Sanchez / 58 y.o. / female  01/15/2019      ASSESSMENT & PLAN:    Problem List Items Addressed This Visit        Unprioritized    Routine health maintenance - Primary    Relevant Orders    Ambulatory Referral For Screening Colonoscopy    CBC & Differential    Comprehensive Metabolic Panel    Lipid Panel    Urinalysis With Microscopic If Indicated (No Culture) - Urine, Clean Catch    Essential hypertension    Relevant Medications    bisoprolol-hydrochlorothiazide (ZIAC) 10-6.25 MG per tablet    Left-sided carotid artery disease (CMS/HCC)    Overview     On ultrasonic screening November 2017.  Angiogram done 2016 showed no evidence of stenosis.         Relevant Orders    Duplex Carotid Ultrasound CAR      Other Visit Diagnoses     Nonintractable episodic headache, unspecified headache type            Orders Placed This Encounter   Procedures   • Comprehensive Metabolic Panel   • Lipid Panel   • Urinalysis With Microscopic If Indicated (No Culture) - Urine, Clean Catch   • Ambulatory Referral For Screening Colonoscopy   • CBC & Differential       Summary/Discussion:    Number-one routine health maintenance: Clinically stable young lady.  See comments above and below.  My concern today is weight she is possibly 5-10 pounds above her ideal weight.  Recommend repeat exam one year, lab as above, follow-up results by mail.    #2 hypertension stage II elevation, needs improved control, we will increase Ziac to 10-6 0.25 mg per day.  Recommend blood pressure check 6 weeks with .  Patient prefers to not change class of medication at this time to eliminate sexual dysfunction.  We will address this issue in the future at her discretion.  Would suggest an ARB agent at that time.    #3 stenosis of the carotid by ultrasound, with a prior angiogram which showed no disease.  Will check ultrasound and address findings as indicated.  If stenosis is noted, will begin  "treatment with low-dose aspirin.    #4 headaches no abnormality on central nervous system exam.  I do not feel that central nervous system imaging is indicated at this time I suspect that this may be due to uncontrolled blood pressure, will reassess headaches after blood pressure has been brought under better control.    Return in about 6 weeks (around 2/26/2019), or Dr Tolliver, for Blood pressure check.    ____________________________________________________________________    VITALS    Visit Vitals  /100 Comment: HASNT HAD AM B/P MED   Pulse 57   Temp 97.7 °F (36.5 °C)   Ht 160 cm (62.99\")   Wt 66.9 kg (147 lb 6.4 oz)   LMP  (LMP Unknown)   SpO2 97%   Breastfeeding? No   BMI 26.12 kg/m²       BP Readings from Last 3 Encounters:   01/15/19 170/100   12/18/17 144/63   11/20/17 173/94     Wt Readings from Last 3 Encounters:   01/15/19 66.9 kg (147 lb 6.4 oz)   12/18/17 63 kg (139 lb)   11/20/17 58.1 kg (128 lb)      Body mass index is 26.12 kg/m².    CC:  Main reason(s) for today's visit: Annual Exam (CPE)      HPI:     Patient presents this morning for annual physical exam.    Health maintenance: Last ophthalmology within the past 9 months.  Dentist in the past week.  Exercise regularly.  Gynecology within the past 18 months.  Patient is aware she is overdue for mammogram.  Is also overdue for colonoscopy which will be scheduled for her today.  She'll make her own appointment for mammography with her gynecologist since the imaging studies are done in the specific office.    Hypertension: Patient's compliant with medication and exercise.  She does note some occasional headaches throughout the course of the day which seemed to resolve an extra dose of medication.  Typically headaches occur upon arising.  She also notes as a side effect that there is decreased libido with use of the medication as well.    Patient Care Team:  Alexx Abrams MD as PCP - General  Mathew Erazo MD as Consulting Physician " (Obstetrics and Gynecology)  ____________________________________________________________________    REVIEW OF SYSTEMS    Review of Systems   Constitutional: Negative.    HENT: Negative.    Eyes: Negative.    Respiratory: Negative.    Cardiovascular: Negative.    Gastrointestinal: Negative.    Endocrine: Negative.    Genitourinary: Negative.    Musculoskeletal: Negative.    Skin: Negative.    Allergic/Immunologic: Negative for immunocompromised state.   Neurological: Negative.         Patient has increased frequency of headaches over the past several months.  Headache is located centrally, for over 10 in severity, steady in quality.  There is no associated nausea, vomiting, there is no associated photophobia nor phonophobia.  Pain is usually resolved with 1 Advil.  Headaches are typically will noted early in the morning, patient usually takes her medications around midmorning for blood pressure.   Hematological: Negative.    Psychiatric/Behavioral: Negative.          PHYSICAL EXAMINATION    Physical Exam   Constitutional: She is oriented to person, place, and time. She appears well-developed. No distress.   o/w   HENT:   Head: Normocephalic and atraumatic.   Right Ear: Tympanic membrane, external ear and ear canal normal.   Left Ear: Tympanic membrane, external ear and ear canal normal.   Mouth/Throat: Uvula is midline, oropharynx is clear and moist and mucous membranes are normal.   Eyes: Conjunctivae and EOM are normal. Pupils are equal, round, and reactive to light.   Neck: Normal range of motion. Neck supple. No JVD present. Carotid bruit is not present. No thyromegaly present.   Cardiovascular: Normal rate, regular rhythm, normal heart sounds and intact distal pulses. Exam reveals no gallop and no friction rub.   No murmur heard.  Pulmonary/Chest: Effort normal and breath sounds normal. She has no wheezes. She has no rales.   Abdominal: Soft. Bowel sounds are normal. There is no hepatosplenomegaly. There is no  tenderness.   Genitourinary:   Genitourinary Comments: Defer to GYN   Musculoskeletal: Normal range of motion. She exhibits no edema or deformity.   Lymphadenopathy:     She has no cervical adenopathy.   Neurological: She is alert and oriented to person, place, and time. She has normal strength and normal reflexes. No cranial nerve deficit or sensory deficit. Coordination normal.   Skin: Skin is warm and dry. No rash noted.   Psychiatric: She has a normal mood and affect. Her speech is normal and behavior is normal. Judgment and thought content normal. Cognition and memory are normal.   Nursing note and vitals reviewed.        REVIEWED DATA:    Labs:     Lab Results   Component Value Date     08/31/2017    K 4.1 08/31/2017    AST 13 08/31/2017    ALT 19 08/31/2017    BUN 22 (H) 08/31/2017    CREATININE 0.64 08/31/2017    CREATININE 0.46 (L) 07/02/2016    CREATININE 0.45 (L) 07/01/2016    EGFRIFNONA 97 08/31/2017    EGFRIFAFRI 117 08/31/2017       Lab Results   Component Value Date    HGBA1C 5.10 07/01/2016    GLUCOSE 92 07/02/2016    GLUCOSE 94 07/01/2016    GLUCOSE 120 (H) 06/30/2016       Lab Results   Component Value Date     (H) 08/31/2017     (H) 08/05/2016    LDL 96 07/01/2016    HDL 73 (H) 08/31/2017    TRIG 112 08/31/2017       Lab Results   Component Value Date    TSH 0.850 08/31/2017       Lab Results   Component Value Date    WBC 6.67 07/02/2016    HGB 13.0 07/02/2016    HGB 12.8 07/01/2016    HGB 13.0 06/30/2016     07/02/2016       No results found for: PSA      Imaging:         Medical Tests:         Summary of old records / correspondence / consultant report:         Request outside records:         ALLERGIES  Allergies   Allergen Reactions   • Amlodipine Swelling   • Indomethacin Other (See Comments)     Sensitive to the drug--stomach upset  Sensitive to the drug   • Lotensin [Benazepril Hcl] Cough   • Nsaids Nausea Only        MEDICATIONS  Current Outpatient Medications    Medication Sig Dispense Refill   • bisoprolol-hydrochlorothiazide (ZIAC) 10-6.25 MG per tablet Take 1 tablet by mouth Daily. 30 tablet 1   • Ibuprofen (ADVIL) 200 MG capsule Take 1 tablet by mouth As Needed.       No current facility-administered medications for this visit.        PFSH:     The following portions of the patient's history were reviewed and updated as appropriate: Allergies / Current Medications / Past Medical History / Surgical History / Social History / Family History    PROBLEM LIST   Patient Active Problem List   Diagnosis   • Dermatitis, eczematoid   • Blood pressure elevated without history of HTN   • HLD (hyperlipidemia)   • Headache, migraine   • Avitaminosis D   • Transient cerebral ischemia   • Routine health maintenance   • Essential hypertension   • Lateral epicondylitis   • Primary osteoarthritis of both first carpometacarpal joints   • Precordial pain   • Stress   • Left-sided carotid artery disease (CMS/HCC)       PAST MEDICAL HISTORY  Past Medical History:   Diagnosis Date   • Hypertension    • Migraine    • Migraines    • Vitamin D deficiency        SURGICAL HISTORY  Past Surgical History:   Procedure Laterality Date   • BREAST SURGERY     • DILATATION AND CURETTAGE     • ELBOW ARTHROPLASTY Right        SOCIAL HISTORY  Social History     Socioeconomic History   • Marital status: Single     Spouse name: Not on file   • Number of children: Not on file   • Years of education: Not on file   • Highest education level: Not on file   Occupational History   • Occupation: sales  [employer]   Tobacco Use   • Smoking status: Never Smoker   • Smokeless tobacco: Never Used   Substance and Sexual Activity   • Alcohol use: Yes     Comment: occasional   • Drug use: No   • Sexual activity: Defer       FAMILY HISTORY  Family History   Problem Relation Age of Onset   • Stroke Mother    • Coronary artery disease Father         MI    • Heart attack Father    • Diabetes Sister    • Coronary artery  disease Sister         CABG   • Coronary artery disease Brother         PTCA MI   • Stroke Brother    • Heart attack Brother    • Heart disease Brother        Health Maintenance Due   Topic   • ZOSTER VACCINE (1 of 2)   • COLONOSCOPY    • MAMMOGRAM        Overdue health maintenance interventions  reviewed with patient.    Dictated utilizing Dragon voice recognition software

## 2019-03-04 ENCOUNTER — OFFICE VISIT (OUTPATIENT)
Dept: INTERNAL MEDICINE | Age: 59
End: 2019-03-04

## 2019-03-04 VITALS
BODY MASS INDEX: 26.79 KG/M2 | HEART RATE: 48 BPM | WEIGHT: 151.2 LBS | OXYGEN SATURATION: 99 % | SYSTOLIC BLOOD PRESSURE: 172 MMHG | HEIGHT: 63 IN | TEMPERATURE: 97.3 F | DIASTOLIC BLOOD PRESSURE: 92 MMHG

## 2019-03-04 DIAGNOSIS — E78.5 HYPERLIPIDEMIA, UNSPECIFIED HYPERLIPIDEMIA TYPE: ICD-10-CM

## 2019-03-04 DIAGNOSIS — I65.22 STENOSIS OF LEFT CAROTID ARTERY: ICD-10-CM

## 2019-03-04 DIAGNOSIS — I10 ESSENTIAL HYPERTENSION: Primary | ICD-10-CM

## 2019-03-04 DIAGNOSIS — R63.5 ABNORMAL WEIGHT GAIN: ICD-10-CM

## 2019-03-04 LAB — TSH SERPL DL<=0.005 MIU/L-ACNC: 0.75 MIU/ML (ref 0.27–4.2)

## 2019-03-04 PROCEDURE — 99214 OFFICE O/P EST MOD 30 MIN: CPT | Performed by: NURSE PRACTITIONER

## 2019-03-04 RX ORDER — VALSARTAN 80 MG/1
80 TABLET ORAL DAILY
Qty: 30 TABLET | Refills: 5 | Status: SHIPPED | OUTPATIENT
Start: 2019-03-04 | End: 2019-04-08

## 2019-03-04 NOTE — PATIENT INSTRUCTIONS
"[phone]- Scheduling for carotid ultrasound       Hypertension  Hypertension, commonly called high blood pressure, is when the force of blood pumping through the arteries is too strong. The arteries are the blood vessels that carry blood from the heart throughout the body. Hypertension forces the heart to work harder to pump blood and may cause arteries to become narrow or stiff. Having untreated or uncontrolled hypertension can cause heart attacks, strokes, kidney disease, and other problems.  A blood pressure reading consists of a higher number over a lower number. Ideally, your blood pressure should be below 120/80. The first (\"top\") number is called the systolic pressure. It is a measure of the pressure in your arteries as your heart beats. The second (\"bottom\") number is called the diastolic pressure. It is a measure of the pressure in your arteries as the heart relaxes.  What are the causes?  The cause of this condition is not known.  What increases the risk?  Some risk factors for high blood pressure are under your control. Others are not.  Factors you can change  · Smoking.  · Having type 2 diabetes mellitus, high cholesterol, or both.  · Not getting enough exercise or physical activity.  · Being overweight.  · Having too much fat, sugar, calories, or salt (sodium) in your diet.  · Drinking too much alcohol.  Factors that are difficult or impossible to change  · Having chronic kidney disease.  · Having a family history of high blood pressure.  · Age. Risk increases with age.  · Race. You may be at higher risk if you are -American.  · Gender. Men are at higher risk than women before age 45. After age 65, women are at higher risk than men.  · Having obstructive sleep apnea.  · Stress.  What are the signs or symptoms?  Extremely high blood pressure (hypertensive crisis) may cause:  · Headache.  · Anxiety.  · Shortness of breath.  · Nosebleed.  · Nausea and vomiting.  · Severe chest pain.  · Jerky " movements you cannot control (seizures).    How is this diagnosed?  This condition is diagnosed by measuring your blood pressure while you are seated, with your arm resting on a surface. The cuff of the blood pressure monitor will be placed directly against the skin of your upper arm at the level of your heart. It should be measured at least twice using the same arm. Certain conditions can cause a difference in blood pressure between your right and left arms.  Certain factors can cause blood pressure readings to be lower or higher than normal (elevated) for a short period of time:  · When your blood pressure is higher when you are in a health care provider's office than when you are at home, this is called white coat hypertension. Most people with this condition do not need medicines.  · When your blood pressure is higher at home than when you are in a health care provider's office, this is called masked hypertension. Most people with this condition may need medicines to control blood pressure.    If you have a high blood pressure reading during one visit or you have normal blood pressure with other risk factors:  · You may be asked to return on a different day to have your blood pressure checked again.  · You may be asked to monitor your blood pressure at home for 1 week or longer.    If you are diagnosed with hypertension, you may have other blood or imaging tests to help your health care provider understand your overall risk for other conditions.  How is this treated?  This condition is treated by making healthy lifestyle changes, such as eating healthy foods, exercising more, and reducing your alcohol intake. Your health care provider may prescribe medicine if lifestyle changes are not enough to get your blood pressure under control, and if:  · Your systolic blood pressure is above 130.  · Your diastolic blood pressure is above 80.    Your personal target blood pressure may vary depending on your medical  conditions, your age, and other factors.  Follow these instructions at home:  Eating and drinking  · Eat a diet that is high in fiber and potassium, and low in sodium, added sugar, and fat. An example eating plan is called the DASH (Dietary Approaches to Stop Hypertension) diet. To eat this way:  ? Eat plenty of fresh fruits and vegetables. Try to fill half of your plate at each meal with fruits and vegetables.  ? Eat whole grains, such as whole wheat pasta, brown rice, or whole grain bread. Fill about one quarter of your plate with whole grains.  ? Eat or drink low-fat dairy products, such as skim milk or low-fat yogurt.  ? Avoid fatty cuts of meat, processed or cured meats, and poultry with skin. Fill about one quarter of your plate with lean proteins, such as fish, chicken without skin, beans, eggs, and tofu.  ? Avoid premade and processed foods. These tend to be higher in sodium, added sugar, and fat.  · Reduce your daily sodium intake. Most people with hypertension should eat less than 1,500 mg of sodium a day.  · Limit alcohol intake to no more than 1 drink a day for nonpregnant women and 2 drinks a day for men. One drink equals 12 oz of beer, 5 oz of wine, or 1½ oz of hard liquor.  Lifestyle  · Work with your health care provider to maintain a healthy body weight or to lose weight. Ask what an ideal weight is for you.  · Get at least 30 minutes of exercise that causes your heart to beat faster (aerobic exercise) most days of the week. Activities may include walking, swimming, or biking.  · Include exercise to strengthen your muscles (resistance exercise), such as pilates or lifting weights, as part of your weekly exercise routine. Try to do these types of exercises for 30 minutes at least 3 days a week.  · Do not use any products that contain nicotine or tobacco, such as cigarettes and e-cigarettes. If you need help quitting, ask your health care provider.  · Monitor your blood pressure at home as told by  your health care provider.  · Keep all follow-up visits as told by your health care provider. This is important.  Medicines  · Take over-the-counter and prescription medicines only as told by your health care provider. Follow directions carefully. Blood pressure medicines must be taken as prescribed.  · Do not skip doses of blood pressure medicine. Doing this puts you at risk for problems and can make the medicine less effective.  · Ask your health care provider about side effects or reactions to medicines that you should watch for.  Contact a health care provider if:  · You think you are having a reaction to a medicine you are taking.  · You have headaches that keep coming back (recurring).  · You feel dizzy.  · You have swelling in your ankles.  · You have trouble with your vision.  Get help right away if:  · You develop a severe headache or confusion.  · You have unusual weakness or numbness.  · You feel faint.  · You have severe pain in your chest or abdomen.  · You vomit repeatedly.  · You have trouble breathing.  Summary  · Hypertension is when the force of blood pumping through your arteries is too strong. If this condition is not controlled, it may put you at risk for serious complications.  · Your personal target blood pressure may vary depending on your medical conditions, your age, and other factors. For most people, a normal blood pressure is less than 120/80.  · Hypertension is treated with lifestyle changes, medicines, or a combination of both. Lifestyle changes include weight loss, eating a healthy, low-sodium diet, exercising more, and limiting alcohol.  This information is not intended to replace advice given to you by your health care provider. Make sure you discuss any questions you have with your health care provider.  Document Released: 12/18/2006 Document Revised: 11/15/2017 Document Reviewed: 11/15/2017  [employer] Patient Education © 2018 [employer]

## 2019-03-04 NOTE — PROGRESS NOTES
Subjective   Nancie Sanchez is a 58 y.o. female.     History of Present Illness     Patient here for follow up elevated BP, previous patient of Dr. Abrams.   She was seen approximately 6 weeks ago for elevated blood pressure, at that time her Ziac was increased to 10-6.25 mg daily.  She has not been consistent with checking blood pressures at home, reports BP this AM 150s/80ish per patient.  She is also concerned about recent inability to lose weight.  She reports she has been exercising daily, including cardio and weight lifting for approximately 20-25 minutes every day.  She is also been rather restrictive with her diet, eating something small in the morning, protein shake at lunch, and small amount of protein with green salad at dinner time.  She has no known family history of thyroid issues, last thyroid level was checked in 2017.  She does report significant stress related to work.  She also reports last menstrual period was a little bit over one year ago.     The following portions of the patient's history were reviewed and updated as appropriate: allergies, current medications, past family history, past medical history, past social history, past surgical history and problem list.    Review of Systems   Constitutional: Positive for unexpected weight gain. Negative for activity change, appetite change and unexpected weight loss.   Eyes: Negative for visual disturbance.   Respiratory: Negative for shortness of breath.    Cardiovascular: Negative for chest pain, palpitations and leg swelling.   Neurological: Positive for headache. Negative for dizziness and light-headedness.       Objective   Physical Exam   Constitutional: She appears well-developed and well-nourished. She is active. She does not appear ill. No distress.   Cardiovascular: Normal rate, regular rhythm and normal heart sounds.   Pulmonary/Chest: Effort normal and breath sounds normal. She has no decreased breath sounds. She has no wheezes.  She has no rhonchi. She has no rales.   Neurological: She is alert.   Nursing note and vitals reviewed.        Assessment/Plan   Problems Addressed this Visit        Cardiovascular and Mediastinum    HLD (hyperlipidemia)    Essential hypertension - Primary    Relevant Medications    valsartan (DIOVAN) 80 MG tablet    Left-sided carotid artery disease (CMS/HCC)      Other Visit Diagnoses     Abnormal weight gain        Relevant Orders    TSH Rfx On Abnormal To Free T4        1. Essential hypertension  Blood pressure still uncontrolled.  Discussed with patient would recommend adding ARB as per Dr. Abrams previous recommendation.  We will continue Ziac at current dose.  Heart rate is low today, patient is asymptomatic, continue for now. Discussed elevated BP may be related to weight gain, stress. Ideal BP < 130/80 r/t strong family history of stroke and CAD. Will follow up in office BP recheck 1 month with BMP.     - valsartan (DIOVAN) 80 MG tablet; Take 1 tablet by mouth Daily.  Dispense: 30 tablet; Refill: 5    2. Abnormal weight gain  Patient having difficulty losing weight despite restricting calorie intake and regular physical activity.  Will recheck thyroid level today.  Suspect issues with more weight may be a combination of stress and recent menopause, discussed hormonal changes can certainly have an effect on ability to lose weight.    - TSH Rfx On Abnormal To Free T4    3. Hyperlipidemia, unspecified hyperlipidemia type  Discussed most recent lab values with patient, recommended to start statin therapy although I would not start 2 new medications at this time.  Will reevaluate and discuss at next visit.     4. Stenosis of left carotid artery  History of carotid ultrasound showing mild left carotid stenosis in 2017.  Recommended patient follow-up with rescreening per Dr. Abrams recommendations. Patient to call scheduling, may consider paying out of pocket for screening if more affordable.

## 2019-03-05 ENCOUNTER — TELEPHONE (OUTPATIENT)
Dept: INTERNAL MEDICINE | Age: 59
End: 2019-03-05

## 2019-03-05 NOTE — TELEPHONE ENCOUNTER
I think it is too soon to really gauge the consistent effect of the losartan on her blood pressure. She can continue to monitor but tell her not to overdo it as that can cause anxiety that will increase BP.     Also, yes, she can take pepcid.

## 2019-03-05 NOTE — TELEPHONE ENCOUNTER
Patient called and would like to speak to you from her visit yesterday She has questions about her Valsartin 80mg that she was out on.

## 2019-03-05 NOTE — TELEPHONE ENCOUNTER
Pt stated she took Valsartan 80mg this AM. Initially her BP was 140/80, and later was 120/80. Pt took BP a bit ago and systolic was 150. Pt wanted to know if she should be concerned about these numbers. Pt denies any problems otherwise, no side effects/dizziness.    Pt also wanted to know if she can take Pepcid on this new medication.    Please advise.    KD

## 2019-03-06 NOTE — TELEPHONE ENCOUNTER
Pt was advised to continue to monitor BP, and record readings for next visit. Pt was also advised she can take pepcid. Pt demonstrated understanding. KD

## 2019-03-11 ENCOUNTER — TELEPHONE (OUTPATIENT)
Dept: INTERNAL MEDICINE | Age: 59
End: 2019-03-11

## 2019-03-11 NOTE — TELEPHONE ENCOUNTER
"Pt called in today re: BP readings from last few days, approximately 3 days.     Pt stated BP has been fluctuating since adding Valsartan 80mg.    Pt reported today BP of 204/106 w/ rest, and just before she called BP of 180/90 w/ rest.     Pt reported feeling very dizzy, \"like walking sideways,\" nausea, difficulty standing up straight from sitting position.    Pt has tried splitting the dose to counteract dizziness, w/o improvement.     Pt reported 2 days in AM w/ systolic 135 and 140, right after taking medication. However, symptoms worsened throughout the day.     I advised pt to skip tomorrow's dose until I speak with her. Pt was also advised for now, to lie down and rest, continue to monitor BP. Pt was advised to go to ER for immediate evaluation if BP is consistently 180-200 systolic.    Pt demonstrated understanding, agreement.    Please advise.    KD  "

## 2019-03-12 NOTE — TELEPHONE ENCOUNTER
Pt was advised to take medication at night, instead of morning, to help temper S/e. Pt was also advised that if systolic BP stays above 190-200, she is to go to ER for evaluation. Pt was advised to call office in 2-3 days w/ symptom update.     Pt demonstrated understanding, agreement.    KD

## 2019-03-12 NOTE — TELEPHONE ENCOUNTER
Recommend patient take BP medication at night to minimize Side effects. I would not recommend holding this as her BP is too high without it.     If she is still having issues with dizziness, balance, and/or BP staying above 190- 200 systolic, needs to go to ER for evaluation, especially with her history of previous TIA.     If still symptomatic FROM the medication, will need to consider switching. Have her call to update with symptoms in next 2-3 days.

## 2019-04-08 ENCOUNTER — OFFICE VISIT (OUTPATIENT)
Dept: INTERNAL MEDICINE | Age: 59
End: 2019-04-08

## 2019-04-08 VITALS
SYSTOLIC BLOOD PRESSURE: 136 MMHG | HEART RATE: 80 BPM | OXYGEN SATURATION: 98 % | BODY MASS INDEX: 26.75 KG/M2 | TEMPERATURE: 97.7 F | WEIGHT: 151 LBS | DIASTOLIC BLOOD PRESSURE: 88 MMHG | HEIGHT: 63 IN

## 2019-04-08 DIAGNOSIS — I10 ESSENTIAL HYPERTENSION: ICD-10-CM

## 2019-04-08 PROCEDURE — 99214 OFFICE O/P EST MOD 30 MIN: CPT | Performed by: NURSE PRACTITIONER

## 2019-04-08 RX ORDER — VALSARTAN 160 MG/1
160 TABLET ORAL DAILY
Qty: 30 TABLET | Refills: 5 | Status: SHIPPED | OUTPATIENT
Start: 2019-04-08 | End: 2019-11-26

## 2019-04-08 RX ORDER — BISOPROLOL FUMARATE AND HYDROCHLOROTHIAZIDE 10; 6.25 MG/1; MG/1
1 TABLET ORAL DAILY
Qty: 90 TABLET | Refills: 1 | Status: SHIPPED | OUTPATIENT
Start: 2019-04-08 | End: 2019-09-01 | Stop reason: SDUPTHER

## 2019-04-08 NOTE — PROGRESS NOTES
"[facility] [place] INTERNAL MEDICINE  Katia Sanchez / 58 y.o. / female  04/08/2019      ASSESSMENT & PLAN:    Problem List Items Addressed This Visit        Cardiovascular and Mediastinum    Essential hypertension    Relevant Medications    valsartan (DIOVAN) 160 MG tablet    bisoprolol-hydrochlorothiazide (ZIAC) 10-6.25 MG per tablet        No orders of the defined types were placed in this encounter.    New Medications Ordered This Visit   Medications   • valsartan (DIOVAN) 160 MG tablet     Sig: Take 1 tablet by mouth Daily.     Dispense:  30 tablet     Refill:  5   • bisoprolol-hydrochlorothiazide (ZIAC) 10-6.25 MG per tablet     Sig: Take 1 tablet by mouth Daily.     Dispense:  90 tablet     Refill:  1     **Patient requests 90 days supply**       Summary/Discussion:    1. Essential hypertension  Blood pressure still suboptimally controlled, instructed to increase valsartan to 160 mg daily to take at nighttime.  Discussed with patient elevated blood pressure and issues with weight loss are likely multifactorial, consider increased stress and cortisol levels as possible contributing factor.  Recommended patient needs to ensure that she is getting in at least [id number] vj daily as this may cause her body to go into a fasting \"survival\" state and this will likely cause her body to hold onto excess weight.  Discussed that she does not currently meet the criteria for initiation of pharmacologic therapy, and would not be a good candidate for any stimulant medication or phentermine related to issues with uncontrolled blood pressure recently.  She is interested into looking into the hCG diet, which I explained to patient I am not specifically knowledgable regarding this but she is certainly free to look into it on her own if she is interested. I will have patient call me by telephone in 2 weeks with home blood pressure readings, will make further adjustments at that time as needed, tentative " "follow-up in office in 4 months.    Instructed can take over-the-counter Prilosec as needed for reflux.  Instructed patient to avoid eating at least 2 hours prior to bedtime, avoid acidic foods, decrease caffeine intake, avoid alcohol right before bed.    - valsartan (DIOVAN) 160 MG tablet; Take 1 tablet by mouth Daily.  Dispense: 30 tablet; Refill: 5  - bisoprolol-hydrochlorothiazide (ZIAC) 10-6.25 MG per tablet; Take 1 tablet by mouth Daily.  Dispense: 90 tablet; Refill: 1      Return in about 4 months (around 8/8/2019) for Next scheduled follow up.    ____________________________________________________________________    MEDICATIONS  Current Outpatient Medications   Medication Sig Dispense Refill   • bisoprolol-hydrochlorothiazide (ZIAC) 10-6.25 MG per tablet Take 1 tablet by mouth Daily. 90 tablet 1   • Ibuprofen (ADVIL) 200 MG capsule Take 1 tablet by mouth As Needed.     • valsartan (DIOVAN) 160 MG tablet Take 1 tablet by mouth Daily. 30 tablet 5     No current facility-administered medications for this visit.           VITALS:    Visit Vitals  /88   Pulse 80   Temp 97.7 °F (36.5 °C)   Ht 160 cm (62.99\")   Wt 68.5 kg (151 lb)   SpO2 98%   BMI 26.76 kg/m²       BP Readings from Last 3 Encounters:   04/08/19 136/88   03/04/19 172/92   01/15/19 170/100     Wt Readings from Last 3 Encounters:   04/08/19 68.5 kg (151 lb)   03/04/19 68.6 kg (151 lb 3.2 oz)   01/15/19 66.9 kg (147 lb 6.4 oz)      Body mass index is 26.76 kg/m².    CC:  Main reason(s) for today's visit: Hypertension (1 mth F/U )      HPI: Patient here today for follow-up blood pressure recheck.    Patient was seen by me in office approximately 1 month ago; blood pressure was high at that time and she was started on valsartan 80 mg daily.  She reports blood pressures at home have continued to run high high (130s/80s- 150-160s systolic), has had machine calibrated by Dr. Abrams in office previously.     She does report significant stress at work, " has not been able to have time for herself to exercise in the past 2 weeks, has been eating later at night due to getting off work late.  Reports new onset heartburn, worse at nighttime. Takes Pepcid as needed, has been trying to increase PO water intake. Denies any recent changes in diet.     She once again vocalizes her concern about her inability to lose weight. Is eating very small amounts on a daily basis, no time for regular meals, mostly smaller snacks. She would like to discuss possible weight loss medication.     Patient Care Team:  Katia Burch APRN as PCP - General (Internal Medicine)  Mathew Erazo MD as Consulting Physician (Obstetrics and Gynecology)    ____________________________________________________________________    REVIEW OF SYSTEMS    Review of Systems   Constitutional: Negative for activity change, appetite change and unexpected weight change.   HENT: Negative for tinnitus.    Eyes: Negative for visual disturbance.   Respiratory: Negative for cough, chest tightness and shortness of breath.    Cardiovascular: Negative for chest pain, palpitations and leg swelling.   Neurological: Negative for dizziness, light-headedness and headaches.         PHYSICAL EXAMINATION    Physical Exam   Constitutional: She is oriented to person, place, and time. Vital signs are normal. She appears well-developed and well-nourished. She is cooperative. She does not appear ill. No distress.   Cardiovascular: Normal rate, regular rhythm, S1 normal, S2 normal and normal heart sounds.   No murmur heard.  Pulmonary/Chest: Effort normal and breath sounds normal. She has no decreased breath sounds. She has no wheezes. She has no rhonchi. She has no rales.   Neurological: She is alert and oriented to person, place, and time.   Skin: Skin is warm, dry and intact.   Psychiatric: She has a normal mood and affect. Her speech is normal and behavior is normal. Judgment and thought content normal. Cognition and memory  are normal.   Nursing note and vitals reviewed.      REVIEWED DATA:    Labs:     Lab Results   Component Value Date     01/15/2019    K 4.3 01/15/2019    AST 18 01/15/2019    ALT 22 01/15/2019    BUN 16 01/15/2019    CREATININE 0.64 01/15/2019    CREATININE 0.64 08/31/2017    CREATININE 0.46 (L) 07/02/2016    EGFRIFNONA 96 01/15/2019    EGFRIFAFRI 116 01/15/2019       Lab Results   Component Value Date    HGBA1C 5.10 07/01/2016    GLUCOSE 92 07/02/2016    GLUCOSE 94 07/01/2016    GLUCOSE 120 (H) 06/30/2016       Lab Results   Component Value Date     (H) 01/15/2019     (H) 08/31/2017     (H) 08/05/2016    HDL 58 01/15/2019    HDL 73 (H) 08/31/2017    HDL 69 (H) 08/05/2016    TRIG 173 (H) 01/15/2019    TRIG 112 08/31/2017    TRIG 102 08/05/2016       Lab Results   Component Value Date    TSH 0.747 03/04/2019       Lab Results   Component Value Date    WBC 5.77 01/15/2019    HGB 14.3 01/15/2019    HGB 13.0 07/02/2016    HGB 12.8 07/01/2016     01/15/2019       Lab Results   Component Value Date    PROTEIN Negative 01/15/2019    GLUCOSEU Negative 01/15/2019    BLOODU Negative 01/15/2019    NITRITEU Negative 01/15/2019    LEUKOCYTESUR See below: (A) 01/15/2019       Imaging:         Medical Tests:         Summary of old records / correspondence / consultant report:         Request outside records:         ALLERGIES  Allergies   Allergen Reactions   • Amlodipine Swelling   • Indomethacin Other (See Comments)     Sensitive to the drug--stomach upset  Sensitive to the drug   • Lotensin [Benazepril Hcl] Cough   • Nsaids Nausea Only        PFSH:     The following portions of the patient's history were reviewed and updated as appropriate: Allergies / Current Medications / Past Medical History / Surgical History / Social History / Family History    PROBLEM LIST   Patient Active Problem List   Diagnosis   • Dermatitis, eczematoid   • HLD (hyperlipidemia)   • Headache, migraine   • Avitaminosis  D   • Transient cerebral ischemia   • Routine health maintenance   • Essential hypertension   • Lateral epicondylitis   • Primary osteoarthritis of both first carpometacarpal joints   • Precordial pain   • Stress   • Left-sided carotid artery disease (CMS/HCC)       PAST MEDICAL HISTORY  Past Medical History:   Diagnosis Date   • Hypertension    • Migraine    • Migraines    • Vitamin D deficiency        SURGICAL HISTORY  Past Surgical History:   Procedure Laterality Date   • BREAST SURGERY     • DILATATION AND CURETTAGE     • ELBOW ARTHROPLASTY Right        SOCIAL HISTORY  Social History     Socioeconomic History   • Marital status: Single     Spouse name: Not on file   • Number of children: Not on file   • Years of education: Not on file   • Highest education level: Not on file   Occupational History   • Occupation: sales  [employer]   Tobacco Use   • Smoking status: Never Smoker   • Smokeless tobacco: Never Used   Substance and Sexual Activity   • Alcohol use: Yes     Comment: occasional   • Drug use: No   • Sexual activity: Defer   Lifestyle   • Physical activity:     Days per week: 7 days     Minutes per session: 30 min   • Stress: Not on file       FAMILY HISTORY  Family History   Problem Relation Age of Onset   • Stroke Mother    • Coronary artery disease Father         MI    • Heart attack Father    • Diabetes Sister    • Coronary artery disease Sister         CABG   • Coronary artery disease Brother         PTCA MI   • Stroke Brother    • Heart attack Brother    • Heart disease Brother          **Maury Disclaimer:   Much of this encounter note is an electronic transcription/translation of spoken language to printed text. The electronic translation of spoken language may permit erroneous, or at times, nonsensical words or phrases to be inadvertently transcribed. Although I have reviewed the note for such errors, some may still exist.

## 2019-09-01 DIAGNOSIS — I10 ESSENTIAL HYPERTENSION: ICD-10-CM

## 2019-09-03 RX ORDER — BISOPROLOL FUMARATE AND HYDROCHLOROTHIAZIDE 10; 6.25 MG/1; MG/1
1 TABLET ORAL DAILY
Qty: 30 TABLET | Refills: 0 | Status: SHIPPED | OUTPATIENT
Start: 2019-09-03 | End: 2019-11-18 | Stop reason: SDUPTHER

## 2019-11-18 DIAGNOSIS — I10 ESSENTIAL HYPERTENSION: ICD-10-CM

## 2019-11-19 DIAGNOSIS — I10 ESSENTIAL HYPERTENSION: ICD-10-CM

## 2019-11-19 RX ORDER — BISOPROLOL FUMARATE AND HYDROCHLOROTHIAZIDE 10; 6.25 MG/1; MG/1
TABLET ORAL
Qty: 90 TABLET | Refills: 0 | OUTPATIENT
Start: 2019-11-19

## 2019-11-19 RX ORDER — BISOPROLOL FUMARATE AND HYDROCHLOROTHIAZIDE 10; 6.25 MG/1; MG/1
1 TABLET ORAL DAILY
Qty: 15 TABLET | Refills: 0 | Status: SHIPPED | OUTPATIENT
Start: 2019-11-19 | End: 2019-11-26 | Stop reason: SDUPTHER

## 2019-11-26 ENCOUNTER — OFFICE VISIT (OUTPATIENT)
Dept: INTERNAL MEDICINE | Age: 59
End: 2019-11-26

## 2019-11-26 VITALS
TEMPERATURE: 98.5 F | OXYGEN SATURATION: 98 % | SYSTOLIC BLOOD PRESSURE: 158 MMHG | WEIGHT: 145 LBS | HEART RATE: 50 BPM | DIASTOLIC BLOOD PRESSURE: 84 MMHG | HEIGHT: 63 IN | BODY MASS INDEX: 25.69 KG/M2

## 2019-11-26 DIAGNOSIS — F43.0 ANXIETY AS ACUTE REACTION TO GROSS STRESS: ICD-10-CM

## 2019-11-26 DIAGNOSIS — I10 ESSENTIAL HYPERTENSION: Primary | ICD-10-CM

## 2019-11-26 DIAGNOSIS — F41.1 ANXIETY AS ACUTE REACTION TO GROSS STRESS: ICD-10-CM

## 2019-11-26 PROCEDURE — 99214 OFFICE O/P EST MOD 30 MIN: CPT | Performed by: NURSE PRACTITIONER

## 2019-11-26 RX ORDER — BISOPROLOL FUMARATE AND HYDROCHLOROTHIAZIDE 10; 6.25 MG/1; MG/1
1 TABLET ORAL DAILY
Qty: 90 TABLET | Refills: 1 | Status: SHIPPED | OUTPATIENT
Start: 2019-11-26 | End: 2020-05-05

## 2019-11-26 RX ORDER — VALSARTAN 320 MG/1
320 TABLET ORAL DAILY
Qty: 30 TABLET | Refills: 5 | Status: SHIPPED | OUTPATIENT
Start: 2019-11-26 | End: 2020-03-18 | Stop reason: ALTCHOICE

## 2019-11-26 NOTE — PATIENT INSTRUCTIONS
Escitalopram tablets    What is this medicine?  ESCITALOPRAM (es sye CLARK oh pram) is used to treat depression and certain types of anxiety.  This medicine may be used for other purposes; ask your health care provider or pharmacist if you have questions.  COMMON BRAND NAME(S): Lexapro  What should I tell my health care provider before I take this medicine?  They need to know if you have any of these conditions:  -bipolar disorder or a family history of bipolar disorder  -diabetes  -glaucoma  -heart disease  -kidney or liver disease  -receiving electroconvulsive therapy  -seizures (convulsions)  -suicidal thoughts, plans, or attempt by you or a family member  -an unusual or allergic reaction to escitalopram, the related drug citalopram, other medicines, foods, dyes, or preservatives  -pregnant or trying to become pregnant  -breast-feeding  How should I use this medicine?  Take this medicine by mouth with a glass of water. Follow the directions on the prescription label. You can take it with or without food. If it upsets your stomach, take it with food. Take your medicine at regular intervals. Do not take it more often than directed. Do not stop taking this medicine suddenly except upon the advice of your doctor. Stopping this medicine too quickly may cause serious side effects or your condition may worsen.  A special MedGuide will be given to you by the pharmacist with each prescription and refill. Be sure to read this information carefully each time.  Talk to your pediatrician regarding the use of this medicine in children. Special care may be needed.  Overdosage: If you think you have taken too much of this medicine contact a poison control center or emergency room at once.  NOTE: This medicine is only for you. Do not share this medicine with others.  What if I miss a dose?  If you miss a dose, take it as soon as you can. If it is almost time for your next dose, take only that dose. Do not take double or extra  doses.  What may interact with this medicine?  Do not take this medicine with any of the following medications:  -certain medicines for fungal infections like fluconazole, itraconazole, ketoconazole, posaconazole, voriconazole  -cisapride  -citalopram  -dofetilide  -dronedarone  -linezolid  -MAOIs like Carbex, Eldepryl, Marplan, Nardil, and Parnate  -methylene blue (injected into a vein)  -pimozide  -thioridazine  -ziprasidone  This medicine may also interact with the following medications:  -alcohol  -amphetamines  -aspirin and aspirin-like medicines  -carbamazepine  -certain medicines for depression, anxiety, or psychotic disturbances  -certain medicines for migraine headache like almotriptan, eletriptan, frovatriptan, naratriptan, rizatriptan, sumatriptan, zolmitriptan  -certain medicines for sleep  -certain medicines that treat or prevent blood clots like warfarin, enoxaparin, dalteparin  -cimetidine  -diuretics  -fentanyl  -furazolidone  -isoniazid  -lithium  -metoprolol  -NSAIDs, medicines for pain and inflammation, like ibuprofen or naproxen  -other medicines that prolong the QT interval (cause an abnormal heart rhythm)  -procarbazine  -rasagiline  -supplements like [place]'s wort, kava kava, valerian  -tramadol  -tryptophan  This list may not describe all possible interactions. Give your health care provider a list of all the medicines, herbs, non-prescription drugs, or dietary supplements you use. Also tell them if you smoke, drink alcohol, or use illegal drugs. Some items may interact with your medicine.  What should I watch for while using this medicine?  Tell your doctor if your symptoms do not get better or if they get worse. Visit your doctor or health care professional for regular checks on your progress. Because it may take several weeks to see the full effects of this medicine, it is important to continue your treatment as prescribed by your doctor.  Patients and their families should watch out for  new or worsening thoughts of suicide or depression. Also watch out for sudden changes in feelings such as feeling anxious, agitated, panicky, irritable, hostile, aggressive, impulsive, severely restless, overly excited and hyperactive, or not being able to sleep. If this happens, especially at the beginning of treatment or after a change in dose, call your health care professional.  You may get drowsy or dizzy. Do not drive, use machinery, or do anything that needs mental alertness until you know how this medicine affects you. Do not stand or sit up quickly, especially if you are an older patient. This reduces the risk of dizzy or fainting spells. Alcohol may interfere with the effect of this medicine. Avoid alcoholic drinks.  Your mouth may get dry. Chewing sugarless gum or sucking hard candy, and drinking plenty of water may help. Contact your doctor if the problem does not go away or is severe.  What side effects may I notice from receiving this medicine?  Side effects that you should report to your doctor or health care professional as soon as possible:  -allergic reactions like skin rash, itching or hives, swelling of the face, lips, or tongue  -anxious  -black, tarry stools  -changes in vision  -confusion  -elevated mood, decreased need for sleep, racing thoughts, impulsive behavior  -eye pain  -fast, irregular heartbeat  -feeling faint or lightheaded, falls  -feeling agitated, angry, or irritable  -hallucination, loss of contact with reality  -loss of balance or coordination  -loss of memory  -painful or prolonged erections  -restlessness, pacing, inability to keep still  -seizures  -stiff muscles  -suicidal thoughts or other mood changes  -trouble sleeping  -unusual bleeding or bruising  -unusually weak or tired  -vomiting  Side effects that usually do not require medical attention (report to your doctor or health care professional if they continue or are bothersome):  -changes in appetite  -change in sex  drive or performance  -headache  -increased sweating  -indigestion, nausea  -tremors  This list may not describe all possible side effects. Call your doctor for medical advice about side effects. You may report side effects to FDA at 7-009-RJJ-7378.  Where should I keep my medicine?  Keep out of reach of children.  Store at room temperature between 15 and 30 degrees C (59 and 86 degrees F). Throw away any unused medicine after the expiration date.  NOTE: This sheet is a summary. It may not cover all possible information. If you have questions about this medicine, talk to your doctor, pharmacist, or health care provider.  © 2019 [employer] (2017-05-22 13:20:23)

## 2019-11-26 NOTE — PROGRESS NOTES
[facility] INTERNAL MEDICINE  Katia Sanchez / 58 y.o. / female  11/26/2019      ASSESSMENT & PLAN:    Problem List Items Addressed This Visit        Cardiovascular and Mediastinum    Essential hypertension - Primary    Relevant Medications    valsartan (DIOVAN) 320 MG tablet    bisoprolol-hydrochlorothiazide (ZIAC) 10-6.25 MG per tablet      Other Visit Diagnoses     Anxiety as acute reaction to gross stress            No orders of the defined types were placed in this encounter.    New Medications Ordered This Visit   Medications   • valsartan (DIOVAN) 320 MG tablet     Sig: Take 1 tablet by mouth Daily.     Dispense:  30 tablet     Refill:  5   • bisoprolol-hydrochlorothiazide (ZIAC) 10-6.25 MG per tablet     Sig: Take 1 tablet by mouth Daily.     Dispense:  90 tablet     Refill:  1       Summary/Discussion:    1. Essential hypertension  BP continues to be elevated, likely a combination of acute increased stress, use of ibuprofen on a daily basis. Stop ibuprofen, no other OTC NSAIDs, can use Tylenol PRN.   Discussed that we could trial patient on a low-dose of an SSRI such as Lexapro to help with acute anxiety and stress, but she is reluctant to do so.  Will increase valsartan to 320 mg daily.  Discussed stress reduction techniques and meditation.  Will have patient return office in 2 weeks for reevaluation.  Provided with information about escitalopram in case she changes her mind about trying a low-dose of this medication to help with acute distress and anxiety.    - valsartan (DIOVAN) 320 MG tablet; Take 1 tablet by mouth Daily.  Dispense: 30 tablet; Refill: 5  - bisoprolol-hydrochlorothiazide (ZIAC) 10-6.25 MG per tablet; Take 1 tablet by mouth Daily.  Dispense: 90 tablet; Refill: 1    2. Anxiety as acute reaction to gross stress      Return in about 2 weeks (around 12/10/2019) for  "[place].    ____________________________________________________________________    MEDICATIONS  Current Outpatient Medications   Medication Sig Dispense Refill   • bisoprolol-hydrochlorothiazide (ZIAC) 10-6.25 MG per tablet Take 1 tablet by mouth Daily. 90 tablet 1   • valsartan (DIOVAN) 320 MG tablet Take 1 tablet by mouth Daily. 30 tablet 5     No current facility-administered medications for this visit.           VITALS:    Visit Vitals  /84   Pulse 50   Temp 98.5 °F (36.9 °C)   Ht 160 cm (62.99\")   Wt 65.8 kg (145 lb)   SpO2 98%   BMI 25.69 kg/m²       BP Readings from Last 3 Encounters:   11/26/19 158/84   04/08/19 136/88   03/04/19 172/92     Wt Readings from Last 3 Encounters:   11/26/19 65.8 kg (145 lb)   04/08/19 68.5 kg (151 lb)   03/04/19 68.6 kg (151 lb 3.2 oz)      Body mass index is 25.69 kg/m².    CC:  Main reason(s) for today's visit: Hypertension and Med Management      HPI: She is here today for reevaluation of elevated blood pressure.    Patient reports blood pressures have been significantly elevated over the past few weeks, systolic 160-170s. She has been under a tremendous amount of stress, including significant work stress as well as family stress.  She reports her fiancé's parents were both recently hospitalized in the past couple of weeks out of town. She has no preexisting diagnosis of anxiety.  She reports feeling her BP go up in the afternoons while at work. Feels lightheaded, headaches, usually takes ibuprofen 400 mg in combination with her afternoon blood pressure medication.  She has been working hard on losing weight and changing her diet, has lost 6 lbs since last office visit. She is also currently drinking less than 1 cup coffee each morning.     Patient Care Team:  Katia Burch APRN as PCP - General (Internal Medicine)  Mathew Erazo MD as Consulting Physician (Obstetrics and " Gynecology)    ____________________________________________________________________    REVIEW OF SYSTEMS    Review of Systems   Constitutional: Negative for activity change, appetite change and unexpected weight change.   HENT: Negative for tinnitus.    Eyes: Negative for visual disturbance.   Respiratory: Negative for cough, chest tightness and shortness of breath.    Cardiovascular: Negative for chest pain, palpitations and leg swelling.   Neurological: Positive for light-headedness and headaches. Negative for dizziness.   Psychiatric/Behavioral: Positive for sleep disturbance. Negative for self-injury and suicidal ideas. The patient is nervous/anxious.          PHYSICAL EXAMINATION    Physical Exam   Constitutional: She is oriented to person, place, and time. She appears well-developed and well-nourished. She is cooperative. She does not appear ill. No distress.   Cardiovascular: Normal rate, regular rhythm, S1 normal, S2 normal and normal heart sounds.   No murmur heard.  Pulmonary/Chest: Effort normal and breath sounds normal. She has no decreased breath sounds. She has no wheezes. She has no rhonchi. She has no rales.   Neurological: She is alert and oriented to person, place, and time.   Skin: Skin is warm, dry and intact.   Psychiatric: Her speech is normal and behavior is normal. Judgment and thought content normal. Her mood appears anxious. Cognition and memory are normal.   Nursing note and vitals reviewed.      REVIEWED DATA:    Labs:     Lab Results   Component Value Date     01/15/2019    K 4.3 01/15/2019    AST 18 01/15/2019    ALT 22 01/15/2019    BUN 16 01/15/2019    CREATININE 0.64 01/15/2019    CREATININE 0.64 08/31/2017    CREATININE 0.46 (L) 07/02/2016    EGFRIFNONA 96 01/15/2019    EGFRIFAFRI 116 01/15/2019       Lab Results   Component Value Date    HGBA1C 5.10 07/01/2016    GLUCOSE 92 07/02/2016    GLUCOSE 94 07/01/2016    GLUCOSE 120 (H) 06/30/2016       Lab Results   Component Value  Date     (H) 01/15/2019     (H) 08/31/2017     (H) 08/05/2016    HDL 58 01/15/2019    HDL 73 (H) 08/31/2017    HDL 69 (H) 08/05/2016    TRIG 173 (H) 01/15/2019    TRIG 112 08/31/2017    TRIG 102 08/05/2016       Lab Results   Component Value Date    TSH 0.747 03/04/2019       Lab Results   Component Value Date    WBC 5.77 01/15/2019    HGB 14.3 01/15/2019    HGB 13.0 07/02/2016    HGB 12.8 07/01/2016     01/15/2019       Lab Results   Component Value Date    PROTEIN Negative 01/15/2019    GLUCOSEU Negative 01/15/2019    BLOODU Negative 01/15/2019    NITRITEU Negative 01/15/2019    LEUKOCYTESUR See below: (A) 01/15/2019       Imaging:         Medical Tests:         Summary of old records / correspondence / consultant report:         Request outside records:         ALLERGIES  Allergies   Allergen Reactions   • Amlodipine Swelling   • Indomethacin Other (See Comments)     Sensitive to the drug--stomach upset  Sensitive to the drug   • Lotensin [Benazepril Hcl] Cough   • Nsaids Nausea Only        PFSH:     The following portions of the patient's history were reviewed and updated as appropriate: Allergies / Current Medications / Past Medical History / Surgical History / Social History / Family History    PROBLEM LIST   Patient Active Problem List   Diagnosis   • Dermatitis, eczematoid   • HLD (hyperlipidemia)   • Headache, migraine   • Avitaminosis D   • Transient cerebral ischemia   • Routine health maintenance   • Essential hypertension   • Lateral epicondylitis   • Primary osteoarthritis of both first carpometacarpal joints   • Precordial pain   • Stress   • Left-sided carotid artery disease (CMS/HCC)       PAST MEDICAL HISTORY  Past Medical History:   Diagnosis Date   • Hypertension    • Migraine    • Migraines    • Vitamin D deficiency        SURGICAL HISTORY  Past Surgical History:   Procedure Laterality Date   • BREAST SURGERY     • DILATATION AND CURETTAGE     • ELBOW ARTHROPLASTY  Right        SOCIAL HISTORY  Social History     Socioeconomic History   • Marital status: Single     Spouse name: Not on file   • Number of children: Not on file   • Years of education: Not on file   • Highest education level: Not on file   Occupational History   • Occupation: sales  [employer]   Tobacco Use   • Smoking status: Never Smoker   • Smokeless tobacco: Never Used   Substance and Sexual Activity   • Alcohol use: Yes     Comment: occasional   • Drug use: No   • Sexual activity: Defer   Lifestyle   • Physical activity:     Days per week: 7 days     Minutes per session: 30 min   • Stress: Not on file       FAMILY HISTORY  Family History   Problem Relation Age of Onset   • Stroke Mother    • Coronary artery disease Father         MI    • Heart attack Father    • Diabetes Sister    • Coronary artery disease Sister         CABG   • Coronary artery disease Brother         PTCA MI   • Stroke Brother    • Heart attack Brother    • Heart disease Brother          **Maury Disclaimer:   Much of this encounter note is an electronic transcription/translation of spoken language to printed text. The electronic translation of spoken language may permit erroneous, or at times, nonsensical words or phrases to be inadvertently transcribed. Although I have reviewed the note for such errors, some may still exist.

## 2020-03-18 ENCOUNTER — TELEPHONE (OUTPATIENT)
Dept: INTERNAL MEDICINE | Age: 60
End: 2020-03-18

## 2020-03-18 DIAGNOSIS — I10 ESSENTIAL HYPERTENSION: Primary | ICD-10-CM

## 2020-03-18 RX ORDER — OLMESARTAN MEDOXOMIL 40 MG/1
40 TABLET ORAL DAILY
Qty: 90 TABLET | Refills: 3 | Status: SHIPPED | OUTPATIENT
Start: 2020-03-18 | End: 2021-09-07

## 2020-03-18 NOTE — TELEPHONE ENCOUNTER
Patient is calling to request a replacement for the following medication, which her pharmacy told her is on back order.  valsartan (DIOVAN) 320 MG tablet    Patient states she only has one tablet for today, then will be out.    [facility]uly[facility] Marcela Cannon confirmed.    Patient call back [phone]

## 2020-05-05 DIAGNOSIS — I10 ESSENTIAL HYPERTENSION: ICD-10-CM

## 2020-05-05 RX ORDER — BISOPROLOL FUMARATE AND HYDROCHLOROTHIAZIDE 10; 6.25 MG/1; MG/1
1 TABLET ORAL DAILY
Qty: 90 TABLET | Refills: 1 | Status: SHIPPED | OUTPATIENT
Start: 2020-05-05 | End: 2020-10-20

## 2020-10-20 DIAGNOSIS — I10 ESSENTIAL HYPERTENSION: ICD-10-CM

## 2020-10-20 RX ORDER — BISOPROLOL FUMARATE AND HYDROCHLOROTHIAZIDE 10; 6.25 MG/1; MG/1
1 TABLET ORAL DAILY
Qty: 30 TABLET | Refills: 0 | Status: SHIPPED | OUTPATIENT
Start: 2020-10-20 | End: 2020-11-16 | Stop reason: SDUPTHER

## 2020-11-14 DIAGNOSIS — I10 ESSENTIAL HYPERTENSION: ICD-10-CM

## 2020-11-16 DIAGNOSIS — I10 ESSENTIAL HYPERTENSION: ICD-10-CM

## 2020-11-16 RX ORDER — BISOPROLOL FUMARATE AND HYDROCHLOROTHIAZIDE 10; 6.25 MG/1; MG/1
1 TABLET ORAL DAILY
Qty: 30 TABLET | Refills: 0 | OUTPATIENT
Start: 2020-11-16

## 2020-11-16 RX ORDER — BISOPROLOL FUMARATE AND HYDROCHLOROTHIAZIDE 10; 6.25 MG/1; MG/1
1 TABLET ORAL DAILY
Qty: 90 TABLET | Refills: 0 | Status: SHIPPED | OUTPATIENT
Start: 2020-11-16 | End: 2021-02-01

## 2020-11-16 NOTE — TELEPHONE ENCOUNTER
Caller: Nancie Sanchez    Relationship: Self    Best call back number [phone]    Medication needed:   Requested Prescriptions     Pending Prescriptions Disp Refills   • bisoprolol-hydrochlorothiazide (ZIAC) 10-6.25 MG per tablet 30 tablet 0     Sig: Take 1 tablet by mouth Daily.         What details did the patient provide when requesting the medication: PT IS SCHEDULED ON 12/21/2020, SHE IS CURRENTLY SWITCHING INSURANCES AND WANTS TO KNOW OF SHE CAN HAVE A REFILL FOR NOW TO COVER HER TILL HER APPT. SHE IS COMPLETLEY OUT.     Does the patient have less than a 3 day supply:  [x] Yes  [] No    What is the patient's preferred pharmacy: [id number] - [place], [place] - [address]  [address] - [phone]  - [phone] FX

## 2020-12-28 DIAGNOSIS — I10 ESSENTIAL HYPERTENSION: ICD-10-CM

## 2020-12-28 RX ORDER — VALSARTAN 320 MG/1
320 TABLET ORAL DAILY
Qty: 30 TABLET | Refills: 0 | OUTPATIENT
Start: 2020-12-28

## 2021-01-31 DIAGNOSIS — I10 ESSENTIAL HYPERTENSION: ICD-10-CM

## 2021-02-01 RX ORDER — BISOPROLOL FUMARATE AND HYDROCHLOROTHIAZIDE 10; 6.25 MG/1; MG/1
TABLET ORAL
Qty: 30 TABLET | Refills: 0 | Status: SHIPPED | OUTPATIENT
Start: 2021-02-01 | End: 2021-03-02 | Stop reason: ALTCHOICE

## 2021-03-02 ENCOUNTER — OFFICE VISIT (OUTPATIENT)
Dept: INTERNAL MEDICINE | Age: 61
End: 2021-03-02

## 2021-03-02 VITALS
WEIGHT: 144 LBS | HEART RATE: 71 BPM | BODY MASS INDEX: 25.52 KG/M2 | HEIGHT: 63 IN | DIASTOLIC BLOOD PRESSURE: 98 MMHG | SYSTOLIC BLOOD PRESSURE: 160 MMHG | OXYGEN SATURATION: 98 % | TEMPERATURE: 97.5 F

## 2021-03-02 DIAGNOSIS — I10 ESSENTIAL HYPERTENSION: Primary | ICD-10-CM

## 2021-03-02 DIAGNOSIS — E78.5 HYPERLIPIDEMIA, UNSPECIFIED HYPERLIPIDEMIA TYPE: ICD-10-CM

## 2021-03-02 DIAGNOSIS — Z82.49 FAMILY HISTORY OF HEART DISEASE: ICD-10-CM

## 2021-03-02 DIAGNOSIS — Z00.00 ROUTINE HEALTH MAINTENANCE: ICD-10-CM

## 2021-03-02 PROCEDURE — 99215 OFFICE O/P EST HI 40 MIN: CPT | Performed by: NURSE PRACTITIONER

## 2021-03-02 RX ORDER — HYDROCHLOROTHIAZIDE 25 MG/1
25 TABLET ORAL DAILY
Qty: 90 TABLET | Refills: 1 | Status: SHIPPED | OUTPATIENT
Start: 2021-03-02

## 2021-03-02 RX ORDER — BISOPROLOL FUMARATE 10 MG/1
10 TABLET, FILM COATED ORAL DAILY
Qty: 90 TABLET | Refills: 1 | Status: SHIPPED | OUTPATIENT
Start: 2021-03-02 | End: 2021-03-16 | Stop reason: SDUPTHER

## 2021-03-02 NOTE — PROGRESS NOTES
"Chief Complaint  Hypertension and Hyperlipidemia    Subjective          Nancie Sanchez presents to [facility]  Hypertension  This is a chronic problem. The problem is uncontrolled (Reports home BP consistently 150s/90s). Pertinent negatives include no chest pain or shortness of breath. Current antihypertension treatment includes angiotensin blockers, diuretics and beta blockers. There are no compliance problems.  There is no history of CAD/MI.   Hyperlipidemia  This is a chronic problem. The problem is uncontrolled. Pertinent negatives include no chest pain or shortness of breath. She is currently on no antihyperlipidemic treatment. There are no compliance problems.  Risk factors for coronary artery disease include family history, hypertension, post-menopausal and dyslipidemia.       Review of Systems   Constitutional: Negative for fatigue.   HENT: Negative for tinnitus.    Eyes: Negative for visual disturbance.   Respiratory: Negative for shortness of breath.    Cardiovascular: Negative for chest pain.   Neurological: Negative for dizziness and light-headedness.     Objective   Vital Signs:   /98   Pulse 71   Temp 97.5 °F (36.4 °C) (Temporal)   Ht 160 cm (63\")   Wt 65.3 kg (144 lb)   SpO2 98%   BMI 25.51 kg/m²     Physical Exam  Vitals signs and nursing note reviewed.   Constitutional:       General: She is not in acute distress.     Appearance: She is well-developed. She is not ill-appearing.   Cardiovascular:      Rate and Rhythm: Normal rate and regular rhythm.      Heart sounds: Normal heart sounds, S1 normal and S2 normal. No murmur.   Pulmonary:      Effort: Pulmonary effort is normal.      Breath sounds: Normal breath sounds. No decreased breath sounds, wheezing, rhonchi or rales.   Skin:     General: Skin is warm and dry.   Neurological:      Mental Status: She is alert and oriented to person, place, and time.   Psychiatric:         Speech: Speech normal.    "      Behavior: Behavior normal. Behavior is cooperative.         Thought Content: Thought content normal.         Judgment: Judgment normal.        Result Review :   The following data was reviewed by: THANH Olmos on 03/02/2021:             Assessment and Plan    Diagnoses and all orders for this visit:    1. Essential hypertension (Primary)  Went into extensive detail of need for better control blood pressure with patient.  Unfortunately, she has a very significant family history of heart disease in multiple first-degree relatives.  Advised patient that although she is asymptomatic, this does not mean that her uncontrolled blood pressure may not be causing her chronic issues and does predispose her for greater risk of heart disease, stroke, heart attack, kidney damage, etc.  We discussed increasing dosage of diuretic, continue other HTN meds.   She was previously referred to cardiology in 2017, was recommended to have stress test at that time, which she did not complete.  Advised patient recommend referral back to cardiology for further management of blood pressure, cardiac screening related to elevated risk due to family history.     -     bisoprolol (ZEBeta) 10 MG tablet; Take 1 tablet by mouth Daily.  Dispense: 90 tablet; Refill: 1  -     hydroCHLOROthiazide (HYDRODIURIL) 25 MG tablet; Take 1 tablet by mouth Daily.  Dispense: 90 tablet; Refill: 1  -     Comprehensive Metabolic Panel  -     Hemoglobin A1c  -     Ambulatory Referral to Cardiology    2. Hyperlipidemia, unspecified hyperlipidemia type  Not fasting today, will attempt to obtain FLP at next office visit or cardiologist office.     3. Family history of heart disease  -     bisoprolol (ZEBeta) 10 MG tablet; Take 1 tablet by mouth Daily.  Dispense: 90 tablet; Refill: 1  -     hydroCHLOROthiazide (HYDRODIURIL) 25 MG tablet; Take 1 tablet by mouth Daily.  Dispense: 90 tablet; Refill: 1  -     Ambulatory Referral to Cardiology    4. Routine health  maintenance  -     TSH Rfx On Abnormal To Free T4  -     Urinalysis With Microscopic If Indicated (No Culture) - Urine, Clean Catch  -     CBC & Differential  -     Hepatitis C Antibody      I spent 40 minutes caring for Nancie on this date of service. This time includes time spent by me in the following activities:preparing for the visit, reviewing tests, performing a medically appropriate examination and/or evaluation , counseling and educating the patient/family/caregiver, ordering medications, tests, or procedures, documenting information in the medical record and care coordination  Follow Up   Return in about 4 months (around 7/2/2021) for Next scheduled follow up.  Patient was given instructions and counseling regarding her condition or for health maintenance advice. Please see specific information pulled into the AVS if appropriate.

## 2021-03-03 DIAGNOSIS — E78.5 HYPERLIPIDEMIA, UNSPECIFIED HYPERLIPIDEMIA TYPE: ICD-10-CM

## 2021-03-03 DIAGNOSIS — I10 ESSENTIAL HYPERTENSION: Primary | ICD-10-CM

## 2021-03-03 LAB
ALBUMIN SERPL-MCNC: 4.5 G/DL (ref 3.5–5.2)
ALBUMIN/GLOB SERPL: 2 G/DL
ALP SERPL-CCNC: 66 U/L (ref 39–117)
ALT SERPL-CCNC: 16 U/L (ref 1–33)
APPEARANCE UR: CLEAR
AST SERPL-CCNC: 17 U/L (ref 1–32)
BASOPHILS # BLD AUTO: 0.03 10*3/MM3 (ref 0–0.2)
BASOPHILS NFR BLD AUTO: 0.4 % (ref 0–1.5)
BILIRUB SERPL-MCNC: 0.3 MG/DL (ref 0–1.2)
BILIRUB UR QL STRIP: NEGATIVE
BUN SERPL-MCNC: 27 MG/DL (ref 6–20)
BUN/CREAT SERPL: 32.9 (ref 7–25)
CALCIUM SERPL-MCNC: 9.9 MG/DL (ref 8.6–10.5)
CHLORIDE SERPL-SCNC: 99 MMOL/L (ref 98–107)
CO2 SERPL-SCNC: 28.3 MMOL/L (ref 22–29)
COLOR UR: YELLOW
CREAT SERPL-MCNC: 0.82 MG/DL (ref 0.57–1)
EOSINOPHIL # BLD AUTO: 0.21 10*3/MM3 (ref 0–0.4)
EOSINOPHIL NFR BLD AUTO: 3 % (ref 0.3–6.2)
ERYTHROCYTE [DISTWIDTH] IN BLOOD BY AUTOMATED COUNT: 13.1 % (ref 12.3–15.4)
GLOBULIN SER CALC-MCNC: 2.2 GM/DL
GLUCOSE SERPL-MCNC: 103 MG/DL (ref 65–99)
GLUCOSE UR QL: NEGATIVE
HBA1C MFR BLD: 5.6 % (ref 4.8–5.6)
HCT VFR BLD AUTO: 42.2 % (ref 34–46.6)
HCV AB S/CO SERPL IA: <0.1 S/CO RATIO (ref 0–0.9)
HGB BLD-MCNC: 14 G/DL (ref 12–15.9)
HGB UR QL STRIP: NEGATIVE
IMM GRANULOCYTES # BLD AUTO: 0.02 10*3/MM3 (ref 0–0.05)
IMM GRANULOCYTES NFR BLD AUTO: 0.3 % (ref 0–0.5)
KETONES UR QL STRIP: NEGATIVE
LEUKOCYTE ESTERASE UR QL STRIP: NEGATIVE
LYMPHOCYTES # BLD AUTO: 1.66 10*3/MM3 (ref 0.7–3.1)
LYMPHOCYTES NFR BLD AUTO: 23.7 % (ref 19.6–45.3)
MCH RBC QN AUTO: 29.5 PG (ref 26.6–33)
MCHC RBC AUTO-ENTMCNC: 33.2 G/DL (ref 31.5–35.7)
MCV RBC AUTO: 88.8 FL (ref 79–97)
MONOCYTES # BLD AUTO: 0.56 10*3/MM3 (ref 0.1–0.9)
MONOCYTES NFR BLD AUTO: 8 % (ref 5–12)
NEUTROPHILS # BLD AUTO: 4.52 10*3/MM3 (ref 1.7–7)
NEUTROPHILS NFR BLD AUTO: 64.6 % (ref 42.7–76)
NITRITE UR QL STRIP: NEGATIVE
NRBC BLD AUTO-RTO: 0 /100 WBC (ref 0–0.2)
PH UR STRIP: 6 [PH] (ref 5–8)
PLATELET # BLD AUTO: 323 10*3/MM3 (ref 140–450)
POTASSIUM SERPL-SCNC: 4.2 MMOL/L (ref 3.5–5.2)
PROT SERPL-MCNC: 6.7 G/DL (ref 6–8.5)
PROT UR QL STRIP: NEGATIVE
RBC # BLD AUTO: 4.75 10*6/MM3 (ref 3.77–5.28)
SODIUM SERPL-SCNC: 139 MMOL/L (ref 136–145)
SP GR UR: 1.03 (ref 1–1.03)
TSH SERPL DL<=0.005 MIU/L-ACNC: 0.57 UIU/ML (ref 0.27–4.2)
UROBILINOGEN UR STRIP-MCNC: NORMAL MG/DL
WBC # BLD AUTO: 7 10*3/MM3 (ref 3.4–10.8)

## 2021-03-09 ENCOUNTER — OFFICE VISIT (OUTPATIENT)
Dept: CARDIOLOGY | Facility: CLINIC | Age: 61
End: 2021-03-09

## 2021-03-09 VITALS
WEIGHT: 145.4 LBS | DIASTOLIC BLOOD PRESSURE: 90 MMHG | RESPIRATION RATE: 18 BRPM | BODY MASS INDEX: 25.76 KG/M2 | SYSTOLIC BLOOD PRESSURE: 160 MMHG | HEART RATE: 54 BPM | HEIGHT: 63 IN

## 2021-03-09 DIAGNOSIS — Z91.89 AT RISK FOR HEART DISEASE: ICD-10-CM

## 2021-03-09 DIAGNOSIS — Z82.49 FAMILY HISTORY OF HEART DISEASE: ICD-10-CM

## 2021-03-09 DIAGNOSIS — E78.5 HYPERLIPIDEMIA, UNSPECIFIED HYPERLIPIDEMIA TYPE: Primary | ICD-10-CM

## 2021-03-09 PROCEDURE — 99204 OFFICE O/P NEW MOD 45 MIN: CPT | Performed by: INTERNAL MEDICINE

## 2021-03-09 PROCEDURE — 93000 ELECTROCARDIOGRAM COMPLETE: CPT | Performed by: INTERNAL MEDICINE

## 2021-03-10 ENCOUNTER — TELEPHONE (OUTPATIENT)
Dept: CARDIOLOGY | Facility: CLINIC | Age: 61
End: 2021-03-10

## 2021-03-10 NOTE — TELEPHONE ENCOUNTER
Tony PT    PT saw DR Jimenez yesterday and medication was suppose to be sent to the pharmacy [facility] [address]

## 2021-03-15 NOTE — PROGRESS NOTES
Cardiology clinic note  Álvaro Jimenez MD, PhD  Subjective:     Encounter Date:03/09/2021      Patient ID: Nancie Sanchez is a 60 y.o. female.    Chief Complaint:  Chief Complaint   Patient presents with   • Hypertension       HPI:  History of Present Illness  I had the pleasure to see his very pleasant 60-year-old female today as a new patient referred from primary care, she has essential hypertension, hyperlipidemia, family history of early CAD.  Her blood pressure today is 160/90 with heart rate of 54.  Mother passed away of a stroke at age 70 and her father is 79 with a heart attack, brother had CVA and MI and passed away at age 62 and her sister has a multivessel disease with CABG in the past along with diabetes.  She has never been a smoker and she is not currently diabetic.  She does not have any overt chest pain but is very concerned about her pretest probability along with cardiac risks in the future.  She had an echo remotely in 2016 with preserved LV systolic function EF of 60% with normal right left-sided pressures measured noninvasively with normal global and renal regional function with normal diastolic function.  EKG today revealed signs of LVH with repolarization abnormality.  I had a long discussion with the patient about medications of which she has been somewhat averse to taking medications routinely however there is definite secondary changes to hypertension with LVH as evidenced by the EKG, she needs 2D echo for structural and functional valuation to evaluate abnormal EKG with advancing LVH is indication for more strict blood pressure control.  She is asymptomatic without chest pain or shortness of breath presently has no heart failure signs or symptoms no palpitations or other complaints.  She needs fasting lipid panel for ASCVD risk calculation as she is not presently on statin or aspirin with her marked first-degree family history and likely risk stratification and at this time would  "prefer coronary artery calcium scan as she exerts herself routinely without any symptoms of shortness of breath or chest discomfort.  She is amenable to the above approach for risk stratification    Review of systems negative x14 point review of systems except as mentioned above    Historical data copied forward to previous encounters any margin change    Exam  Regular rate and rhythm rate 55 to 60s  No rubs murmurs or gallops  Clear to auscultation  No clubbing cyanosis or edema  Clear\"  Normocephalic atraumatic no carotid bruits no JVD  Neurologic grossly intact bilaterally  Soft nontender nondistended vessels positive  Skin warm and dry  Pulses equal bilaterally 2+    Assessment plan per my encounter  Essential hypertension uncontrolled  Advance Benicar to 80 mg daily  Decrease bisoprolol to 5 mg p.o. daily with heart rates in the 50s    Family history of CAD extensively  Coronary artery calcium scan for restratification  Fasting lipid panel for ASCVD risk calculation  Defer aspirin and statin presently until these are performed for risk assessment    2D echo will be obtained for structural and functional valuation with abnormal EKG and evidence of LVH    The following portions of the patient's history were reviewed and updated as appropriate: allergies, current medications, past family history, past medical history, past social history, past surgical history and problem list.    Problem List:  Patient Active Problem List   Diagnosis   • Dermatitis, eczematoid   • HLD (hyperlipidemia)   • Headache, migraine   • Avitaminosis D   • Transient cerebral ischemia   • Routine health maintenance   • Essential hypertension   • Lateral epicondylitis   • Primary osteoarthritis of both first carpometacarpal joints   • Precordial pain   • Stress   • Left-sided carotid artery disease (CMS/HCC)       Past Medical History:  Past Medical History:   Diagnosis Date   • Hypertension    • Migraine    • Migraines    • Vitamin D " "deficiency        Past Surgical History:  Past Surgical History:   Procedure Laterality Date   • BREAST SURGERY     • DILATATION AND CURETTAGE     • ELBOW ARTHROPLASTY Right        Social History:  Social History     Socioeconomic History   • Marital status: Single     Spouse name: Not on file   • Number of children: Not on file   • Years of education: Not on file   • Highest education level: Not on file   Tobacco Use   • Smoking status: Never Smoker   • Smokeless tobacco: Never Used   Substance and Sexual Activity   • Alcohol use: Yes     Comment: occasional   • Drug use: No   • Sexual activity: Defer       Allergies:  Allergies   Allergen Reactions   • Amlodipine Swelling   • Indomethacin Other (See Comments)     Sensitive to the drug--stomach upset  Sensitive to the drug   • Lotensin [Benazepril Hcl] Cough   • Nsaids Nausea Only       Immunizations:  Immunization History   Administered Date(s) Administered   • DTaP 04/13/2015       ROS:  ROS       Objective:         /90 (BP Location: Left arm, Patient Position: Sitting)   Pulse 54   Resp 18   Ht 160 cm (63\")   Wt 66 kg (145 lb 6.4 oz)   BMI 25.76 kg/m²     Physical Exam    In-Office Procedure(s):    ECG 12 Lead    Date/Time: 3/9/2021 1:00 PM  Performed by: Álvaro Jimenez MD  Authorized by: Álvaro Jimenez MD   Comparison: not compared with previous ECG   Previous ECG: no previous ECG available  Rhythm: sinus rhythm  Rate: normal  QRS axis: normal  Other findings: non-specific ST-T wave changes and left ventricular hypertrophy    Clinical impression: abnormal EKG            ASCVD RIsk Score::  The 10-year ASCVD risk score (Keven OGLESBY Jr., et al., 2013) is: 6.6%    Values used to calculate the score:      Age: 60 years      Sex: Female      Is Non- : No      Diabetic: No      Tobacco smoker: No      Systolic Blood Pressure: 160 mmHg      Is BP treated: Yes      HDL Cholesterol: 58 mg/dL      Total Cholesterol: 252 " mg/dL    Recent Radiology:  Imaging Results (Most Recent)     None          Lab Review:   Office Visit on 03/02/2021   Component Date Value   • Glucose 03/02/2021 103*   • BUN 03/02/2021 27*   • Creatinine 03/02/2021 0.82    • eGFR Non African Am 03/02/2021 72    • eGFR  Am 03/02/2021 87    • BUN/Creatinine Ratio 03/02/2021 32.9*   • Sodium 03/02/2021 139    • Potassium 03/02/2021 4.2    • Chloride 03/02/2021 99    • Total CO2 03/02/2021 28.3    • Calcium 03/02/2021 9.9    • Total Protein 03/02/2021 6.7    • Albumin 03/02/2021 4.50    • Globulin 03/02/2021 2.2    • A/G Ratio 03/02/2021 2.0    • Total Bilirubin 03/02/2021 0.3    • Alkaline Phosphatase 03/02/2021 66    • AST (SGOT) 03/02/2021 17    • ALT (SGPT) 03/02/2021 16    • Hemoglobin A1C 03/02/2021 5.60    • TSH 03/02/2021 0.573    • Specific Gravity, UA 03/02/2021 1.029    • pH, UA 03/02/2021 6.0    • Color, UA 03/02/2021 Yellow    • Appearance, UA 03/02/2021 Clear    • Leukocytes, UA 03/02/2021 Negative    • Protein 03/02/2021 Negative    • Glucose, UA 03/02/2021 Negative    • Ketones 03/02/2021 Negative    • Blood, UA 03/02/2021 Negative    • Bilirubin, UA 03/02/2021 Negative    • Urobilinogen, UA 03/02/2021 Comment    • Nitrite, UA 03/02/2021 Negative    • WBC 03/02/2021 7.00    • RBC 03/02/2021 4.75    • Hemoglobin 03/02/2021 14.0    • Hematocrit 03/02/2021 42.2    • MCV 03/02/2021 88.8    • MCH 03/02/2021 29.5    • MCHC 03/02/2021 33.2    • RDW 03/02/2021 13.1    • Platelets 03/02/2021 323    • Neutrophil Rel % 03/02/2021 64.6    • Lymphocyte Rel % 03/02/2021 23.7    • Monocyte Rel % 03/02/2021 8.0    • Eosinophil Rel % 03/02/2021 3.0    • Basophil Rel % 03/02/2021 0.4    • Neutrophils Absolute 03/02/2021 4.52    • Lymphocytes Absolute 03/02/2021 1.66    • Monocytes Absolute 03/02/2021 0.56    • Eosinophils Absolute 03/02/2021 0.21    • Basophils Absolute 03/02/2021 0.03    • Immature Granulocyte Rel* 03/02/2021 0.3    • Immature Grans Absolute  03/02/2021 0.02    • nRBC 03/02/2021 0.0    • Hep C Virus Ab 03/02/2021 <0.1                 Assessment:          Diagnosis Plan   1. At risk for heart disease  Adult Transthoracic Echo Complete W/ Cont if Necessary Per Protocol    CT Cardiac Calcium Score Without Dye    Lipid Panel   2. Family history of heart disease  Adult Transthoracic Echo Complete W/ Cont if Necessary Per Protocol    CT Cardiac Calcium Score Without Dye    Lipid Panel   3. Hyperlipidemia, unspecified hyperlipidemia type  Lipid Panel                Álvaro Jimenez MD  03/15/21  .

## 2021-03-15 NOTE — TELEPHONE ENCOUNTER
Pt called to check on her BP prescription that were supposed to be sent in last week     Pt CB# [phone]

## 2021-03-16 DIAGNOSIS — I10 ESSENTIAL HYPERTENSION: ICD-10-CM

## 2021-03-16 DIAGNOSIS — Z82.49 FAMILY HISTORY OF HEART DISEASE: ICD-10-CM

## 2021-03-16 RX ORDER — OLMESARTAN MEDOXOMIL 40 MG/1
80 TABLET ORAL DAILY
Qty: 60 TABLET | Refills: 5 | Status: SHIPPED | OUTPATIENT
Start: 2021-03-16 | End: 2021-09-07

## 2021-03-16 RX ORDER — BISOPROLOL FUMARATE 5 MG/1
5 TABLET, FILM COATED ORAL DAILY
Qty: 30 TABLET | Refills: 5 | Status: SHIPPED | OUTPATIENT
Start: 2021-03-16

## 2021-04-06 ENCOUNTER — OFFICE VISIT (OUTPATIENT)
Dept: CARDIOLOGY | Facility: CLINIC | Age: 61
End: 2021-04-06

## 2021-04-06 VITALS
HEART RATE: 51 BPM | RESPIRATION RATE: 18 BRPM | HEIGHT: 63 IN | WEIGHT: 144.2 LBS | SYSTOLIC BLOOD PRESSURE: 150 MMHG | DIASTOLIC BLOOD PRESSURE: 84 MMHG | BODY MASS INDEX: 25.55 KG/M2

## 2021-04-06 DIAGNOSIS — R07.2 PRECORDIAL PAIN: ICD-10-CM

## 2021-04-06 DIAGNOSIS — Z82.49 FAMILY HISTORY OF HEART DISEASE: ICD-10-CM

## 2021-04-06 DIAGNOSIS — I51.7 LVH (LEFT VENTRICULAR HYPERTROPHY): ICD-10-CM

## 2021-04-06 DIAGNOSIS — I10 ESSENTIAL HYPERTENSION: Primary | ICD-10-CM

## 2021-04-06 DIAGNOSIS — E78.5 HYPERLIPIDEMIA, UNSPECIFIED HYPERLIPIDEMIA TYPE: ICD-10-CM

## 2021-04-06 PROCEDURE — 99214 OFFICE O/P EST MOD 30 MIN: CPT | Performed by: INTERNAL MEDICINE

## 2021-04-06 NOTE — PROGRESS NOTES
Subjective:     Encounter Date:04/06/2021      Patient ID: Nancie Sanchez is a 60 y.o. female.    Chief Complaint:  Chief Complaint   Patient presents with   • Follow-up       HPI:  History of Present Illness  I had the pleasure to see his very pleasant 60-year-old female today as a new patient referred from primary care, she has essential hypertension, hyperlipidemia, family history of early CAD.  Her blood pressure today is 160/90 with heart rate of 54.  Mother passed away of a stroke at age 70 and her father is 79 with a heart attack, brother had CVA and MI and passed away at age 62 and her sister has a multivessel disease with CABG in the past along with diabetes.  She has never been a smoker and she is not currently diabetic.  She does not have any overt chest pain but is very concerned about her pretest probability along with cardiac risks in the future.  She had an echo remotely in 2016 with preserved LV systolic function EF of 60% with normal right left-sided pressures measured noninvasively with normal global and renal regional function with normal diastolic function.  EKG today revealed signs of LVH with repolarization abnormality.  I had a long discussion with the patient about medications of which she has been somewhat averse to taking medications routinely however there is definite secondary changes to hypertension with LVH as evidenced by the EKG, she needs 2D echo for structural and functional valuation to evaluate abnormal EKG with advancing LVH is indication for more strict blood pressure control.  She is asymptomatic without chest pain or shortness of breath presently has no heart failure signs or symptoms no palpitations or other complaints.  She needs fasting lipid panel for ASCVD risk calculation as she is not presently on statin or aspirin with her marked first-degree family history and likely risk stratification and at this time would prefer coronary artery calcium scan as she exerts  "herself routinely without any symptoms of shortness of breath or chest discomfort.  She is amenable to the above approach for risk stratification     Review of systems negative x14 point review of systems except as mentioned above     Historical data copied forward to previous encounters any margin change    Today on follow-up encounter she denies any symptoms.  Her heart rates in the 50s still despite doing down on bisoprolol previously, her blood pressure still 150 systolic is mildly better than 160 systolic with increasing her irbesartan to 80 daily in combination with HCTZ.  She denies any dizziness palpitations PND orthopnea heart failure signs or symptoms or anginal chest pain.  We again discussed LVH seen by EKG and we need 2D echo to confirm along with blood pressure goal is less than 135 systolic.     Exam  Regular rate and rhythm rate 55 to 60s  No rubs murmurs or gallops  Clear to auscultation  No clubbing cyanosis or edema  Clear\"  Normocephalic atraumatic no carotid bruits no JVD  Neurologic grossly intact bilaterally  Soft nontender nondistended vessels positive  Skin warm and dry  Pulses equal bilaterally 2+     Assessment plan per my encounter  Essential hypertension uncontrolled  Advance Benicar to 80 mg daily  Decrease bisoprolol to 5 mg p.o. daily with heart rates in the 50s     Family history of CAD extensively  Coronary artery calcium scan for restratification  Fasting lipid panel for ASCVD risk calculation  Defer aspirin and statin presently until these are performed for risk assessment     2D echo will be obtained for structural and functional valuation with abnormal EKG and evidence of LVH    testing to be completed on 4/19/2021    The following portions of the patient's history were reviewed and updated as appropriate: allergies, current medications, past family history, past medical history, past social history, past surgical history and problem list.    Problem List:  Patient Active " "Problem List   Diagnosis   • Dermatitis, eczematoid   • HLD (hyperlipidemia)   • Headache, migraine   • Avitaminosis D   • Transient cerebral ischemia   • Routine health maintenance   • Essential hypertension   • Lateral epicondylitis   • Primary osteoarthritis of both first carpometacarpal joints   • Precordial pain   • Stress   • Left-sided carotid artery disease (CMS/HCC)       Past Medical History:  Past Medical History:   Diagnosis Date   • Hypertension    • Migraine    • Migraines    • Vitamin D deficiency        Past Surgical History:  Past Surgical History:   Procedure Laterality Date   • BREAST SURGERY     • DILATATION AND CURETTAGE     • ELBOW ARTHROPLASTY Right        Social History:  Social History     Socioeconomic History   • Marital status: Single     Spouse name: Not on file   • Number of children: Not on file   • Years of education: Not on file   • Highest education level: Not on file   Tobacco Use   • Smoking status: Never Smoker   • Smokeless tobacco: Never Used   Substance and Sexual Activity   • Alcohol use: Yes     Comment: occasional   • Drug use: No   • Sexual activity: Defer       Allergies:  Allergies   Allergen Reactions   • Amlodipine Swelling   • Indomethacin Other (See Comments)     Sensitive to the drug--stomach upset  Sensitive to the drug   • Lotensin [Benazepril Hcl] Cough   • Nsaids Nausea Only       Immunizations:  Immunization History   Administered Date(s) Administered   • DTaP 04/13/2015       ROS:  ROS       Objective:         /84 (BP Location: Left arm, Patient Position: Sitting)   Pulse 51   Resp 18   Ht 160 cm (63\")   Wt 65.4 kg (144 lb 3.2 oz)   BMI 25.54 kg/m²     Physical Exam  exam is unchanged today  In-Office Procedure(s):  Procedures    ASCVD RIsk Score::  The 10-year ASCVD risk score (Keven OGLESBY Jr., et al., 2013) is: 5.8%    Values used to calculate the score:      Age: 60 years      Sex: Female      Is Non- : No      Diabetic: No    "   Tobacco smoker: No      Systolic Blood Pressure: 150 mmHg      Is BP treated: Yes      HDL Cholesterol: 58 mg/dL      Total Cholesterol: 252 mg/dL    Recent Radiology:  Imaging Results (Most Recent)     None          Lab Review:   Office Visit on 03/02/2021   Component Date Value   • Glucose 03/02/2021 103*   • BUN 03/02/2021 27*   • Creatinine 03/02/2021 0.82    • eGFR Non African Am 03/02/2021 72    • eGFR  Am 03/02/2021 87    • BUN/Creatinine Ratio 03/02/2021 32.9*   • Sodium 03/02/2021 139    • Potassium 03/02/2021 4.2    • Chloride 03/02/2021 99    • Total CO2 03/02/2021 28.3    • Calcium 03/02/2021 9.9    • Total Protein 03/02/2021 6.7    • Albumin 03/02/2021 4.50    • Globulin 03/02/2021 2.2    • A/G Ratio 03/02/2021 2.0    • Total Bilirubin 03/02/2021 0.3    • Alkaline Phosphatase 03/02/2021 66    • AST (SGOT) 03/02/2021 17    • ALT (SGPT) 03/02/2021 16    • Hemoglobin A1C 03/02/2021 5.60    • TSH 03/02/2021 0.573    • Specific Gravity, UA 03/02/2021 1.029    • pH, UA 03/02/2021 6.0    • Color, UA 03/02/2021 Yellow    • Appearance, UA 03/02/2021 Clear    • Leukocytes, UA 03/02/2021 Negative    • Protein 03/02/2021 Negative    • Glucose, UA 03/02/2021 Negative    • Ketones 03/02/2021 Negative    • Blood, UA 03/02/2021 Negative    • Bilirubin, UA 03/02/2021 Negative    • Urobilinogen, UA 03/02/2021 Comment    • Nitrite, UA 03/02/2021 Negative    • WBC 03/02/2021 7.00    • RBC 03/02/2021 4.75    • Hemoglobin 03/02/2021 14.0    • Hematocrit 03/02/2021 42.2    • MCV 03/02/2021 88.8    • MCH 03/02/2021 29.5    • MCHC 03/02/2021 33.2    • RDW 03/02/2021 13.1    • Platelets 03/02/2021 323    • Neutrophil Rel % 03/02/2021 64.6    • Lymphocyte Rel % 03/02/2021 23.7    • Monocyte Rel % 03/02/2021 8.0    • Eosinophil Rel % 03/02/2021 3.0    • Basophil Rel % 03/02/2021 0.4    • Neutrophils Absolute 03/02/2021 4.52    • Lymphocytes Absolute 03/02/2021 1.66    • Monocytes Absolute 03/02/2021 0.56    • Eosinophils  Absolute 03/02/2021 0.21    • Basophils Absolute 03/02/2021 0.03    • Immature Granulocyte Rel* 03/02/2021 0.3    • Immature Grans Absolute 03/02/2021 0.02    • nRBC 03/02/2021 0.0    • Hep C Virus Ab 03/02/2021 <0.1         Plan as above as discussed    greater than 25 minutes spent face-to-face with the patient as well as in coordination of care, additional time and charting         Álvaro Jimenez MD  04/06/21  .

## 2021-04-19 ENCOUNTER — APPOINTMENT (OUTPATIENT)
Dept: CT IMAGING | Facility: HOSPITAL | Age: 61
End: 2021-04-19

## 2021-04-19 ENCOUNTER — HOSPITAL ENCOUNTER (OUTPATIENT)
Dept: CARDIOLOGY | Facility: HOSPITAL | Age: 61
End: 2021-04-19

## 2021-04-21 DIAGNOSIS — I10 ESSENTIAL HYPERTENSION: ICD-10-CM

## 2021-09-07 RX ORDER — OLMESARTAN MEDOXOMIL 40 MG/1
80 TABLET ORAL DAILY
Qty: 60 TABLET | Refills: 5 | Status: SHIPPED | OUTPATIENT
Start: 2021-09-07